# Patient Record
Sex: FEMALE | Race: WHITE | HISPANIC OR LATINO | Employment: FULL TIME | URBAN - METROPOLITAN AREA
[De-identification: names, ages, dates, MRNs, and addresses within clinical notes are randomized per-mention and may not be internally consistent; named-entity substitution may affect disease eponyms.]

---

## 2018-05-02 DIAGNOSIS — Z01.812 PRE-OPERATIVE LABORATORY EXAMINATION: ICD-10-CM

## 2018-05-02 LAB
BASOPHILS # BLD AUTO: 0.7 % (ref 0–1.8)
BASOPHILS # BLD: 0.05 K/UL (ref 0–0.12)
EOSINOPHIL # BLD AUTO: 0.1 K/UL (ref 0–0.51)
EOSINOPHIL NFR BLD: 1.5 % (ref 0–6.9)
ERYTHROCYTE [DISTWIDTH] IN BLOOD BY AUTOMATED COUNT: 37.8 FL (ref 35.9–50)
HCG SERPL QL: NEGATIVE
HCT VFR BLD AUTO: 41.9 % (ref 37–47)
HGB BLD-MCNC: 14.1 G/DL (ref 12–16)
IMM GRANULOCYTES # BLD AUTO: 0.03 K/UL (ref 0–0.11)
IMM GRANULOCYTES NFR BLD AUTO: 0.4 % (ref 0–0.9)
LYMPHOCYTES # BLD AUTO: 1.68 K/UL (ref 1–4.8)
LYMPHOCYTES NFR BLD: 24.9 % (ref 22–41)
MCH RBC QN AUTO: 29.3 PG (ref 27–33)
MCHC RBC AUTO-ENTMCNC: 33.7 G/DL (ref 33.6–35)
MCV RBC AUTO: 86.9 FL (ref 81.4–97.8)
MONOCYTES # BLD AUTO: 0.41 K/UL (ref 0–0.85)
MONOCYTES NFR BLD AUTO: 6.1 % (ref 0–13.4)
NEUTROPHILS # BLD AUTO: 4.48 K/UL (ref 2–7.15)
NEUTROPHILS NFR BLD: 66.4 % (ref 44–72)
NRBC # BLD AUTO: 0 K/UL
NRBC BLD-RTO: 0 /100 WBC
PLATELET # BLD AUTO: 374 K/UL (ref 164–446)
PMV BLD AUTO: 8.6 FL (ref 9–12.9)
RBC # BLD AUTO: 4.82 M/UL (ref 4.2–5.4)
WBC # BLD AUTO: 6.8 K/UL (ref 4.8–10.8)

## 2018-05-02 PROCEDURE — 36415 COLL VENOUS BLD VENIPUNCTURE: CPT

## 2018-05-02 PROCEDURE — 84703 CHORIONIC GONADOTROPIN ASSAY: CPT

## 2018-05-02 PROCEDURE — 85025 COMPLETE CBC W/AUTO DIFF WBC: CPT

## 2018-05-03 ENCOUNTER — HOSPITAL ENCOUNTER (OUTPATIENT)
Facility: MEDICAL CENTER | Age: 38
End: 2018-05-03
Attending: PLASTIC SURGERY | Admitting: PLASTIC SURGERY
Payer: COMMERCIAL

## 2018-05-03 VITALS
SYSTOLIC BLOOD PRESSURE: 136 MMHG | WEIGHT: 184.3 LBS | HEART RATE: 59 BPM | DIASTOLIC BLOOD PRESSURE: 83 MMHG | TEMPERATURE: 98.4 F | RESPIRATION RATE: 16 BRPM | HEIGHT: 64 IN | BODY MASS INDEX: 31.47 KG/M2 | OXYGEN SATURATION: 95 %

## 2018-05-03 DIAGNOSIS — C50.819 OVERLAPPING MALIGNANT NEOPLASM OF FEMALE BREAST, UNSPECIFIED ESTROGEN RECEPTOR STATUS, UNSPECIFIED LATERALITY (HCC): ICD-10-CM

## 2018-05-03 PROCEDURE — 160035 HCHG PACU - 1ST 60 MINS PHASE I: Performed by: PLASTIC SURGERY

## 2018-05-03 PROCEDURE — 500438 HCHG DRESSING, SPANDAGE #10 12: Performed by: PLASTIC SURGERY

## 2018-05-03 PROCEDURE — A6402 STERILE GAUZE <= 16 SQ IN: HCPCS | Performed by: PLASTIC SURGERY

## 2018-05-03 PROCEDURE — 160028 HCHG SURGERY MINUTES - 1ST 30 MINS LEVEL 3: Performed by: PLASTIC SURGERY

## 2018-05-03 PROCEDURE — A9270 NON-COVERED ITEM OR SERVICE: HCPCS

## 2018-05-03 PROCEDURE — 501445 HCHG STAPLER, SKIN DISP: Performed by: PLASTIC SURGERY

## 2018-05-03 PROCEDURE — 160036 HCHG PACU - EA ADDL 30 MINS PHASE I: Performed by: PLASTIC SURGERY

## 2018-05-03 PROCEDURE — 700101 HCHG RX REV CODE 250

## 2018-05-03 PROCEDURE — 700111 HCHG RX REV CODE 636 W/ 250 OVERRIDE (IP)

## 2018-05-03 PROCEDURE — 160002 HCHG RECOVERY MINUTES (STAT): Performed by: PLASTIC SURGERY

## 2018-05-03 PROCEDURE — 160009 HCHG ANES TIME/MIN: Performed by: PLASTIC SURGERY

## 2018-05-03 PROCEDURE — 501838 HCHG SUTURE GENERAL: Performed by: PLASTIC SURGERY

## 2018-05-03 PROCEDURE — 700102 HCHG RX REV CODE 250 W/ 637 OVERRIDE(OP)

## 2018-05-03 PROCEDURE — 160048 HCHG OR STATISTICAL LEVEL 1-5: Performed by: PLASTIC SURGERY

## 2018-05-03 PROCEDURE — 500054 HCHG BANDAGE, ELASTIC 6: Performed by: PLASTIC SURGERY

## 2018-05-03 PROCEDURE — 500423 HCHG DRESSING, ABD COMBINE: Performed by: PLASTIC SURGERY

## 2018-05-03 RX ORDER — SODIUM CHLORIDE, SODIUM LACTATE, POTASSIUM CHLORIDE, CALCIUM CHLORIDE 600; 310; 30; 20 MG/100ML; MG/100ML; MG/100ML; MG/100ML
INJECTION, SOLUTION INTRAVENOUS
Status: DISCONTINUED | OUTPATIENT
Start: 2018-05-03 | End: 2018-05-03 | Stop reason: HOSPADM

## 2018-05-03 RX ORDER — BUPIVACAINE HYDROCHLORIDE AND EPINEPHRINE 5; 5 MG/ML; UG/ML
INJECTION, SOLUTION EPIDURAL; INTRACAUDAL; PERINEURAL
Status: DISCONTINUED | OUTPATIENT
Start: 2018-05-03 | End: 2018-05-03 | Stop reason: HOSPADM

## 2018-05-03 RX ORDER — ONDANSETRON 2 MG/ML
4 INJECTION INTRAMUSCULAR; INTRAVENOUS EVERY 4 HOURS PRN
Status: DISCONTINUED | OUTPATIENT
Start: 2018-05-03 | End: 2018-05-03 | Stop reason: HOSPADM

## 2018-05-03 RX ORDER — ACETAMINOPHEN 325 MG/1
325 TABLET ORAL EVERY 4 HOURS PRN
Status: DISCONTINUED | OUTPATIENT
Start: 2018-05-03 | End: 2018-05-03 | Stop reason: HOSPADM

## 2018-05-03 RX ORDER — OXYCODONE HYDROCHLORIDE 5 MG/1
10 TABLET ORAL
Status: DISCONTINUED | OUTPATIENT
Start: 2018-05-03 | End: 2018-05-03 | Stop reason: HOSPADM

## 2018-05-03 RX ORDER — ACETAMINOPHEN 650 MG/1
650 SUPPOSITORY RECTAL EVERY 4 HOURS PRN
Status: DISCONTINUED | OUTPATIENT
Start: 2018-05-03 | End: 2018-05-03 | Stop reason: HOSPADM

## 2018-05-03 RX ORDER — MORPHINE SULFATE 4 MG/ML
5 INJECTION, SOLUTION INTRAMUSCULAR; INTRAVENOUS
Status: DISCONTINUED | OUTPATIENT
Start: 2018-05-03 | End: 2018-05-03 | Stop reason: HOSPADM

## 2018-05-03 RX ORDER — OXYCODONE HYDROCHLORIDE AND ACETAMINOPHEN 5; 325 MG/1; MG/1
1-2 TABLET ORAL EVERY 6 HOURS PRN
Qty: 10 TAB | Refills: 0 | Status: SHIPPED | OUTPATIENT
Start: 2018-05-03 | End: 2018-05-05

## 2018-05-03 RX ORDER — OXYCODONE HCL 5 MG/5 ML
SOLUTION, ORAL ORAL
Status: COMPLETED
Start: 2018-05-03 | End: 2018-05-03

## 2018-05-03 RX ORDER — SODIUM CHLORIDE, SODIUM LACTATE, POTASSIUM CHLORIDE, CALCIUM CHLORIDE 600; 310; 30; 20 MG/100ML; MG/100ML; MG/100ML; MG/100ML
INJECTION, SOLUTION INTRAVENOUS CONTINUOUS
Status: DISCONTINUED | OUTPATIENT
Start: 2018-05-03 | End: 2018-05-03 | Stop reason: HOSPADM

## 2018-05-03 RX ADMIN — OXYCODONE HYDROCHLORIDE 5 MG: 5 SOLUTION ORAL at 10:27

## 2018-05-03 ASSESSMENT — PAIN SCALES - GENERAL
PAINLEVEL_OUTOF10: 0
PAINLEVEL_OUTOF10: 2
PAINLEVEL_OUTOF10: 0
PAINLEVEL_OUTOF10: 5
PAINLEVEL_OUTOF10: 0
PAINLEVEL_OUTOF10: 5

## 2018-05-03 NOTE — OR SURGEON
Immediate Post OP Note    PreOp Diagnosis: history of right breast cancer and right chest wall radiation    PostOp Diagnosis: same    Procedure(s):  TISSUE EXPANDER PLACE/REMOVE - REMOVAL - Wound Class: Clean    Surgeon(s):  Jose L Huff M.D.    Anesthesiologist/Type of Anesthesia:  Anesthesiologist: Zhanna Hardy M.D./General    Surgical Staff:  Circulator: Mita Echavarria R.N.; Dian Carrizales R.N.  Scrub Person: Sil Alfaro    Specimens removed if any:  * No specimens in log *    Estimated Blood Loss: minimal    Findings: see operative note    Complications: no apparent    #[id number]    5/3/2018 9:34 AM Jose L Huff M.D.

## 2018-05-03 NOTE — OR NURSING
1110: assumed care of patient at this time.  Pt sleeping.  vss.    1135: pt up oob to bathroom to void.  Pt denies complaints.   1142: Pt discharged at this time.  Pt and family verbalize understanding of d/c instructions

## 2018-05-03 NOTE — DISCHARGE INSTRUCTIONS
ACTIVITY: Rest and take it easy for the first 24 hours.  A responsible adult is recommended to remain with you during that time.  It is normal to feel sleepy.  We encourage you to not do anything that requires balance, judgment or coordination.    MILD FLU-LIKE SYMPTOMS ARE NORMAL. YOU MAY EXPERIENCE GENERALIZED MUSCLE ACHES, THROAT IRRITATION, HEADACHE AND/OR SOME NAUSEA.    FOR 24 HOURS DO NOT:  Drive, operate machinery or run household appliances.  Drink beer or alcoholic beverages.   Make important decisions or sign legal documents.    SPECIAL INSTRUCTIONS: May use ice pack to chest as needed.    DIET: To avoid nausea, slowly advance diet as tolerated, avoiding spicy or greasy foods for the first day.  Add more substantial food to your diet according to your physician's instructions.  Babies can be fed formula or breast milk as soon as they are hungry.  INCREASE FLUIDS AND FIBER TO AVOID CONSTIPATION.    SURGICAL DRESSING/BATHING: Please keep dressings clean, dry and intact until follow-up. No showers while drain is in place. See instructions for drain care.     FOLLOW-UP APPOINTMENT:  A follow-up appointment should be arranged with your doctor in 05/09/2018; call to schedule.    You should CALL YOUR PHYSICIAN if you develop:  Fever greater than 101 degrees F.  Pain not relieved by medication, or persistent nausea or vomiting.  Excessive bleeding (blood soaking through dressing) or unexpected drainage from the wound.  Extreme redness or swelling around the incision site, drainage of pus or foul smelling drainage.  Inability to urinate or empty your bladder within 8 hours.  Problems with breathing or chest pain.    You should call 911 if you develop problems with breathing or chest pain.  If you are unable to contact your doctor or surgical center, you should go to the nearest emergency room or urgent care center.  Physician's telephone #: [phone]    If any questions arise, call your doctor.  If your doctor is  not available, please feel free to call the Surgical Center at [phone].  The Center is open Monday through Friday from 7AM to 7PM.  You can also call the HEALTH HOTLINE open 24 hours/day, 7 days/week and speak to a nurse at [phone], or toll free at [phone].    A registered nurse may call you a few days after your surgery to see how you are doing after your procedure.    MEDICATIONS: Resume taking daily medication.  Take prescribed pain medication with food.  If no medication is prescribed, you may take non-aspirin pain medication if needed.  PAIN MEDICATION CAN BE VERY CONSTIPATING.  Take a stool softener or laxative such as senokot, pericolace, or milk of magnesia if needed.    Prescription given for percocet.  Last pain medication given at 10:27am.    If your physician has prescribed pain medication that includes Acetaminophen (Tylenol), do not take additional Acetaminophen (Tylenol) while taking the prescribed medication.    Depression / Suicide Risk    As you are discharged from this [facility] facility, it is important to learn how to keep safe from harming yourself.    Recognize the warning signs:  · Abrupt changes in personality, positive or negative- including increase in energy   · Giving away possessions  · Change in eating patterns- significant weight changes-  positive or negative  · Change in sleeping patterns- unable to sleep or sleeping all the time   · Unwillingness or inability to communicate  · Depression  · Unusual sadness, discouragement and loneliness  · Talk of wanting to die  · Neglect of personal appearance   · Rebelliousness- reckless behavior  · Withdrawal from people/activities they love  · Confusion- inability to concentrate     If you or a loved one observes any of these behaviors or has concerns about self-harm, here's what you can do:  · Talk about it- your feelings and reasons for harming yourself  · Remove any means that you might use to hurt yourself  (examples: pills, rope, extension cords, firearm)  · Get professional help from the community (Mental Health, Substance Abuse, psychological counseling)  · Do not be alone:Call your Safe Contact- someone whom you trust who will be there for you.  · Call your local CRISIS HOTLINE 424-7099 or 370-378-4284  · Call your local Children's Mobile Crisis Response Team [place] (300) 646-0418 or www.[employer]  · Call the toll free National Suicide Prevention Hotlines   · National Suicide Prevention Lifeline 361-412-UBID (4198)  · National Hope Line Network 800-SUICIDE (395-4725)

## 2018-05-03 NOTE — OP REPORT
DATE OF SERVICE:  05/03/2018    PREOPERATIVE DIAGNOSES:  1.  History of right breast cancer.  2.  History of right chest wall radiation.  3.  A deflation of right tissue expander.    POSTOPERATIVE DIAGNOSES:  1.  History of right breast cancer.  2.  History of right chest wall radiation.  3.  A deflation of right tissue expander.    PROCEDURE: right tissue expander removal without implant placement.    ATTENDING SURGEON:  Jose L Huff MD    ANESTHESIOLOGIST:  Zhanna Hardy MD    ANESTHESIA TYPE:  General.    SPECIMENS:  None.    ESTIMATED BLOOD LOSS:  Minimal.    COMPLICATIONS:  No apparent.    ASSISTANT:  None.    INDICATIONS FOR PROCEDURE:  The patient is a 37-year-old woman who is well   known to me who previously underwent bilateral mastectomies and reconstruction   with placement of tissue expanders.  The patient did have problems on the   left side due to an infection and ultimately underwent tissue expander   removal.  The patient had required radiation therapy on the right side and had   deflation of her expander.  The patient has chosen not to pursue further   breast reconstruction.  We did discuss that if she were to lose weight in the   future and that she did want to undergo reconstruction we would need to do a   latissimus muscle flap on the radiated side.  At this point, the patient does   not want to do this.  She now presents for removal of the right tissue   expander.    DESCRIPTION OF PROCEDURE:  After the operative and nonoperative options were   discussed and include was not limited to bleeding, infection, damage to   surrounding structures, need for further surgery, reaction to anesthetic   agent, scarring, breast asymmetry, contour irregularities, wound healing   difficulties, need for revisional surgery, deep venous thrombosis, pulmonary   embolus, myocardial infarction, stroke, unsatisfactory result and/or death,   informed consent was obtained.  Patient brought to the operating room  where   general anesthesia was induced.  Her chest was then prepped and draped in   usual sterile fashion.  An incision was made down to the old mastectomy scar.    Cautery was then used to dissect down through the underlying tissue down to   the underlying capsule, which was opened up.  The tissue expander was already   deflated.  The tissue expander was then grasped and removed.  The pocket was   then irrigated with irrigation.  A #7 flat drain was placed and secured with   3-0 nylon suture.  The overlying tissue was then closed with 2-0 Vicryl   sutures in the capsule, 3-0 deep dermal Monocryl sutures and running 4-0   Monocryl subcuticular stitch.  Local anesthetic was placed for postoperative   pain control.  The Steri-Strips and compressive dressing was then placed.  The   patient was then awakened, extubated and transferred to the PACU in stable   condition.  At the end of procedure, all sponge, instrument and needle counts   were correct.       ____________________________________     MD PRAKASH RODRIGUEZ / JENSEN    DD:  05/03/2018 10:07:22  DT:  05/03/2018 10:26:35    D#:  [id number]  Job#:  [id number]

## 2018-05-03 NOTE — OR NURSING
1010 Patient arrived from OR on [facility]. Report received from RN and Anesthesia. Patient's VS WNL, patient arousable, stable, breathing even/non labored.       1027 Patient complained of 5/10 pain, oxycodone given as ordered.     1040  at bedside. Prescription for percocet given to .     1115 Report given to MARIAM Ruiz. Patient sleeping, VSS.

## 2018-08-29 ENCOUNTER — OCCUPATIONAL MEDICINE (OUTPATIENT)
Dept: URGENT CARE | Facility: PHYSICIAN GROUP | Age: 38
End: 2018-08-29
Payer: COMMERCIAL

## 2018-08-29 VITALS
SYSTOLIC BLOOD PRESSURE: 120 MMHG | OXYGEN SATURATION: 97 % | DIASTOLIC BLOOD PRESSURE: 76 MMHG | HEIGHT: 64 IN | HEART RATE: 77 BPM | TEMPERATURE: 96.9 F | WEIGHT: 181 LBS | RESPIRATION RATE: 18 BRPM | BODY MASS INDEX: 30.9 KG/M2

## 2018-08-29 DIAGNOSIS — S06.0X0A CONCUSSION WITHOUT LOSS OF CONSCIOUSNESS, INITIAL ENCOUNTER: ICD-10-CM

## 2018-08-29 DIAGNOSIS — M62.838 MUSCLE SPASM: ICD-10-CM

## 2018-08-29 PROCEDURE — 99214 OFFICE O/P EST MOD 30 MIN: CPT | Mod: 29 | Performed by: FAMILY MEDICINE

## 2018-08-29 RX ORDER — CYCLOBENZAPRINE HCL 10 MG
10 TABLET ORAL
Qty: 15 TAB | Refills: 0 | Status: SHIPPED | OUTPATIENT
Start: 2018-08-29 | End: 2020-11-12
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
## 2018-09-01 ENCOUNTER — OCCUPATIONAL MEDICINE (OUTPATIENT)
Dept: URGENT CARE | Facility: PHYSICIAN GROUP | Age: 38
End: 2018-09-01
Payer: COMMERCIAL

## 2018-09-01 VITALS
WEIGHT: 182.2 LBS | BODY MASS INDEX: 31.1 KG/M2 | HEIGHT: 64 IN | OXYGEN SATURATION: 97 % | DIASTOLIC BLOOD PRESSURE: 80 MMHG | RESPIRATION RATE: 12 BRPM | HEART RATE: 97 BPM | SYSTOLIC BLOOD PRESSURE: 122 MMHG | TEMPERATURE: 98.4 F

## 2018-09-01 DIAGNOSIS — S06.0X0D CONCUSSION WITHOUT LOSS OF CONSCIOUSNESS, SUBSEQUENT ENCOUNTER: ICD-10-CM

## 2018-09-01 PROCEDURE — 99213 OFFICE O/P EST LOW 20 MIN: CPT | Mod: 29 | Performed by: PHYSICIAN ASSISTANT
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
## 2019-06-21 ENCOUNTER — HOSPITAL ENCOUNTER (OUTPATIENT)
Dept: LAB | Facility: MEDICAL CENTER | Age: 39
End: 2019-06-21
Attending: OBSTETRICS & GYNECOLOGY
Payer: COMMERCIAL

## 2019-06-21 PROCEDURE — 88175 CYTOPATH C/V AUTO FLUID REDO: CPT

## 2019-06-22 LAB — CYTOLOGY REG CYTOL: NORMAL

## 2020-03-19 ENCOUNTER — HOSPITAL ENCOUNTER (OUTPATIENT)
Dept: RADIOLOGY | Facility: MEDICAL CENTER | Age: 40
End: 2020-03-19
Attending: INTERNAL MEDICINE
Payer: COMMERCIAL

## 2020-03-19 DIAGNOSIS — C50.411 MALIGNANT NEOPLASM OF UPPER-OUTER QUADRANT OF RIGHT FEMALE BREAST, UNSPECIFIED ESTROGEN RECEPTOR STATUS (HCC): ICD-10-CM

## 2020-03-19 PROCEDURE — 76705 ECHO EXAM OF ABDOMEN: CPT

## 2020-10-27 ENCOUNTER — HOSPITAL ENCOUNTER (OUTPATIENT)
Dept: LAB | Facility: MEDICAL CENTER | Age: 40
End: 2020-10-27
Attending: OBSTETRICS & GYNECOLOGY
Payer: COMMERCIAL

## 2020-10-27 PROCEDURE — 88175 CYTOPATH C/V AUTO FLUID REDO: CPT

## 2020-10-27 PROCEDURE — 87624 HPV HI-RISK TYP POOLED RSLT: CPT

## 2020-10-29 LAB
CYTOLOGY REG CYTOL: NORMAL
HPV HR 12 DNA CVX QL NAA+PROBE: NEGATIVE
HPV16 DNA SPEC QL NAA+PROBE: NEGATIVE
HPV18 DNA SPEC QL NAA+PROBE: NEGATIVE
SPECIMEN SOURCE: NORMAL

## 2020-11-12 ENCOUNTER — APPOINTMENT (OUTPATIENT)
Dept: RADIOLOGY | Facility: IMAGING CENTER | Age: 40
End: 2020-11-12
Attending: NURSE PRACTITIONER
Payer: COMMERCIAL

## 2020-11-12 ENCOUNTER — OCCUPATIONAL MEDICINE (OUTPATIENT)
Dept: URGENT CARE | Facility: PHYSICIAN GROUP | Age: 40
End: 2020-11-12
Payer: COMMERCIAL

## 2020-11-12 VITALS
BODY MASS INDEX: 31.76 KG/M2 | WEIGHT: 186 LBS | TEMPERATURE: 97.2 F | RESPIRATION RATE: 16 BRPM | OXYGEN SATURATION: 97 % | DIASTOLIC BLOOD PRESSURE: 70 MMHG | SYSTOLIC BLOOD PRESSURE: 110 MMHG | HEIGHT: 64 IN | HEART RATE: 73 BPM

## 2020-11-12 DIAGNOSIS — S69.91XA HAND INJURY, RIGHT, INITIAL ENCOUNTER: ICD-10-CM

## 2020-11-12 DIAGNOSIS — S60.221A CONTUSION OF RIGHT HAND, INITIAL ENCOUNTER: ICD-10-CM

## 2020-11-12 PROCEDURE — 99214 OFFICE O/P EST MOD 30 MIN: CPT | Performed by: NURSE PRACTITIONER

## 2020-11-12 PROCEDURE — 73130 X-RAY EXAM OF HAND: CPT | Mod: TC,RT | Performed by: NURSE PRACTITIONER

## 2020-11-12 ASSESSMENT — ENCOUNTER SYMPTOMS
FEVER: 0
MYALGIAS: 0
CHILLS: 0
VOMITING: 0
TINGLING: 0
SHORTNESS OF BREATH: 0
NAUSEA: 0

## 2020-11-12 NOTE — PROGRESS NOTES
"Subjective:      Brenna Abraham is a 40 y.o. 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  She denies any numbness or tingling in the extremity.  She reports pain with palpation.  She has not taken anything for this.  Injured at work: Yes.  Previous injury: None.  Contributing medical illness: None.  Second job: None.  Outside activity: None.  Location: Right hand          Review of Systems   Constitutional: Negative for chills and fever.   Respiratory: Negative for shortness of breath.    Cardiovascular: Negative for chest pain.   Gastrointestinal: Negative for nausea and vomiting.   Musculoskeletal: Positive for joint pain. Negative for myalgias.   Skin: Negative for rash.   Neurological: Negative for tingling.   All other systems reviewed and are negative.    PMH: No pertinent past medical history to this problem  MEDS: Medications were reviewed in EMR  ALLERGIES: Allergies were reviewed in EMR  SOCHX: Works in a warehouse  FH: No pertinent family history to this problem         Objective:     /70 (BP Location: Left arm, Patient Position: Sitting, BP Cuff Size: Adult)   Pulse 73   Temp 36.2 °C (97.2 °F) (Temporal)   Resp 16   Ht 1.626 m (5' 4\")   Wt 84.4 kg (186 lb)   SpO2 97%   BMI 31.93 kg/m²      Physical Exam  Vitals signs reviewed.   Constitutional:       General: She is not in acute distress.     Appearance: Normal appearance.   HENT:      Head: Normocephalic.      Right Ear: External ear normal.      Left Ear: External ear normal.   Neck:      Musculoskeletal: Normal range of motion. No neck rigidity.   Cardiovascular:      Rate and Rhythm: Normal rate.   " Pulses: Normal pulses.   Pulmonary:      Effort: Pulmonary effort is normal.   Abdominal:      Palpations: Abdomen is soft.   Musculoskeletal: Normal range of motion.        Hands:    Skin:     General: Skin is warm.   Neurological:      Mental Status: She is alert and oriented to person, place, and time.   Psychiatric:         Mood and Affect: Mood normal.         Behavior: Behavior normal.         Right hand/wrist: Mild soft tissue swelling over dorsum of hand.  No deformity or bruising noted. Moderate tenderness to palpation. No snuff box tenderness.  ROM limited secondary to pain. Strength and sensation intact.  Distal N/V intact           Assessment/Plan:        1. Contusion of right hand, initial encounter  DX-HAND 3+ RIGHT     Xray: no fracture or dislocation per radiology  RICE therapy discussed  Gentle ROM exercises discussed  Ice/heat therapy discussed  May take over-the-counter Ibuprofen 600-800 mg every 8 hours as needed for pain  Rest, fluids encouraged.  Work restrictions per D-39  F/U in 4 days.   Pt was in full understanding and agreement with the plan.  Follow-up as needed if symptoms worsen or fail to improve.

## 2020-11-12 NOTE — LETTER
[address] - [place] [address]  Phone:  [phone] - Fax:  [phone]   [facility] Progress Report and Disability Certification  Date of Service: 2020   No Show:  No  Date / Time of Next Visit: 2020 @8:00AM   Claim Information   Patient Name: Brenna Abraham  Claim Number:     Employer:   [employer] Date of Injury: 2020     Insurer / TPA:    ID / SSN:     Occupation: Receiving  Diagnosis: The encounter diagnosis was Contusion of right hand, initial encounter.    Medical Information   Related to Industrial Injury? 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  She denies any numbness or tingling in the extremity.  She reports pain with palpation.  She has not taken anything for this.  Injured at work: Yes.  Previous injury: None.  Contributing medical illness: None.  Second job: None.  Outside activity: None.  Location: Right hand        Objective Findings: Right hand/wrist: Mild soft tissue swelling over dorsum of hand.  No deformity or bruising noted. Moderate tenderness to palpation. No snuff box tenderness.  ROM limited secondary to pain. Strength and sensation intact.  Distal N/V intact       Pre-Existing Condition(s): None   Assessment:   Initial Visit    Status: Additional Care Required  Permanent Disability:No    Plan:   Comments:OTC nsaids    Diagnostics: X-ray  Comments:Negative for fracture     Comments:       Disability Information   Status: Released to Restricted Duty    From:  2020  Through: 2020 Restrictions are: Temporary   Physical Restrictions   Sitting:    Standing:    Stooping:    Bending:      Squatting:    Walking:    Climbing:    Pushin hrs/day  Comments:Right hand only    Pullin hrs/day  Comments:Right hand only Other:    Reaching Above Shoulder (L):   Reaching Above Shoulder (R):       Reaching Below Shoulder (L):    Reaching Below Shoulder (R):      Not to exceed Weight Limits   Carrying(hrs):   Weight Limit(lb):   Comments:No lifting right hand only Lifting(hrs):   Weight  Limit(lb):   Comments:No lifting right hand only   Comments: Rest, ice/heat therapy discussed, gentle ROM exercises encouraged, OTC ibuprofen, work restrictions Thumb spica  F/U in 4 days.      Repetitive Actions   Hands: i.e. Fine Manipulations from Grasping:     Feet: i.e. Operating Foot Controls:     Driving / Operate Machinery:     Provider Name:   JOANA Vásquez Physician Signature:  Physician Name:     Clinic Name / Location: [address]  [place] [address] Clinic Phone Number: Dept: [phone]   Appointment Time: 12:55 Pm Visit Start Time: 2:09 PM   Check-In Time:  1:25 Pm Visit Discharge Time:  3:37 PM   Original-Treating Physician or Chiropractor    Page 2-Insurer/TPA    Page 3-Employer    Page 4-Employee

## 2020-11-12 NOTE — LETTER
"EMPLOYEE’S CLAIM FOR COMPENSATION/ REPORT OF INITIAL TREATMENT  FORM C-4    EMPLOYEE’S CLAIM - PROVIDE ALL INFORMATION REQUESTED   First Name  Brenna Last Name  Leigh Birthdate                    1980                Sex  female Claim Number   Home Address  [address] Age  40 y.o. 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 (if applicable)  Receiving    How did this injury or occupational disease occur? (Be specific an answer in detail. Use additional sheet if necessary)  I was receiving items and i didn't notice when i pull a box was another on the top so i pull it and fall in my hand   If you believe that you have an occupational disease, when did you first have knowledge of the disability and it relationship to your employment?  N/A Witnesses to the Accident  None      Nature of Injury or Occupational Disease  Sprain  Part(s) of Body Injured or Affected  Hand (R), N/A, N/A    I certify that the above is true and correct to the best of my knowledge and that I have provided this information in order to " obtain the benefits of [place]  I hereby authorize any physician, chiropractor, surgeon, practitioner, or other person, any hospital, including [facility] or [facility] hospital, any medical service organization, any insurance company, or other institution or organization to release to each other, any medical or other information, including benefits paid or payable, pertinent to this injury or disease, except information relative to diagnosis, treatment and/or counseling for AIDS, psychological conditions, alcohol or controlled substances, for which I must give specific authorization.  A Photostat of this authorization shall be as valid as the original.     Date 11/12/2020   Place [place]   Employee’s Signature   THIS REPORT MUST BE COMPLETED AND MAILED WITHIN 3 WORKING DAYS OF TREATMENT   Place  [facility]  Name of Facility  [place]   Date  11/12/2020 Diagnosis  (S60.221A) Contusion of right hand, initial encounter Is there evidence the injured employee was under the              influence of alcohol and/or another controlled substance at the time of accident?   Hour  2:09 PM Description of Injury or Disease  The encounter diagnosis was Contusion of right hand, initial encounter. No   Treatment  RICE therapy  OTC ibuprofen    Have you advised the patient to remain off work five days or     more? No   X-Ray Findings  Negative   If Yes   From Date  To Date      From information given by the employee, together with medical evidence, can you directly connect this injury or occupational disease as job incurred?  Yes If No Full Duty    Yes Modified Duty  No   Is additional medical care by a physician indicated?  Yes If Modified Duty, Specify any Limitations / Restrictions  No lifting, pushing or pulling right hand    Do you know of any previous injury or disease contributing  "to this condition or occupational disease?                            No   Date  11/12/2020 Print Doctor’s Name   JOANA Vásquez I certify the employer’s copy of  this form was mailed on:   Address  [address] Insurer’s Use Only     [facility] Zip  [address]    Provider’s Tax ID Number  [id number] Telephone  Dept: [phone]      e-DEVANTE Graham  Signature:     Degree          ORIGINAL-TREATING PHYSICIAN OR CHIROPRACTOR    PAGE 2-INSURER/TPA    PAGE 3-EMPLOYER    PAGE 4-EMPLOYEE        Form C-4 (rev.10/07)          BRIEF DESCRIPTION OF RIGHTS AND BENEFITS  (Pursuant to [place] 616C.[place])    Notice of Injury or Occupational Disease (Incident Report Form C-1): If an injury or occupational disease (OD) arises out of and in the course of employment, you must provide written notice to your employer as soon as practicable, but no later than 7 days after the accident or OD. Your employer shall maintain a sufficient supply of the required forms.     Claim for Compensation (Form C-4): If medical treatment is sought, the form C-4 is available at the place of initial treatment. A completed \"Claim for Compensation\" (Form C-4) must be filed within 90 days after an accident or OD. The treating physician or chiropractor must, within 3 working days after treatment, complete and mail to the employer, the employer's insurer and third-party , the Claim for Compensation.     Medical Treatment: If you require medical treatment for your on-the-job injury or OD, you may be required to select a physician or chiropractor from a list provided by your workers’ compensation insurer, if it has contracted with an Organization for Managed Care (MCO) or Preferred Provider Organization (PPO) or providers of health care. If your employer has not entered into a contract with an MCO or PPO, you may select a physician or chiropractor from the Panel of Physicians and Chiropractors. " Any medical costs related to your industrial injury or OD will be paid by your insurer.     Temporary Total Disability (TTD): If your doctor has certified that you are unable to work for a period of at least 5 consecutive days, or 5 cumulative days in a 20-day period, or places restrictions on you that your employer does not accommodate, you may be entitled to TTD compensation.     Temporary Partial Disability (TPD): If the wage you receive upon reemployment is less than the compensation for TTD to which you are entitled, the insurer may be required to pay you TPD compensation to make up the difference. TPD can only be paid for a maximum of 24 months.     Permanent Partial Disability (PPD): When your medical condition is stable and there is an indication of a PPD as a result of your injury or OD, within 30 days, your insurer must arrange for an evaluation by a rating physician or chiropractor to determine the degree of your PPD. The amount of your PPD award depends on the date of injury, the results of the PPD evaluation and your age and wage.     Permanent Total Disability (PTD): If you are medically certified by a treating physician or chiropractor as permanently and totally disabled and have been granted a PTD status by your insurer, you are entitled to receive monthly benefits not to exceed 66 2/3% of your average monthly wage. The amount of your PTD payments is subject to reduction if you previously received a PPD award.     Vocational Rehabilitation Services: You may be eligible for vocational rehabilitation services if you are unable to return to the job due to a permanent physical impairment or permanent restrictions as a result of your injury or occupational disease.     Transportation and Per Mateo Reimbursement: You may be eligible for travel expenses and per mateo associated with medical treatment.     Reopening: You may be able to reopen your claim if your condition worsens after claim closure.     Appeal  Process: If you disagree with a written determination issued by the insurer or the insurer does not respond to your request, you may appeal to the Department of Administration, , by following the instructions contained in your determination letter. You must appeal the determination within 70 days from the date of the determination letter at [address], [address], [place], [place] [address], or [address], [address], [place], [place] [address]. If you disagree with the  decision, you may appeal to the Department of Administration, . You must file your appeal within 30 days from the date of the  decision letter at [address], [address], [place], [place] [address], or [address], [address], [place], [place] [address]. If you disagree with a decision of an , you may file a petition for judicial review with the District Court. You must do so within 30 days of the Appeal Officer’s decision. You may be represented by an  at your own expense or you may contact the [facility] for possible representation.     [place]  for Injured Workers (NAIW): If you disagree with a  decision, you may request that NAIW represent you without charge at an  Hearing. For information regarding denial of benefits, you may contact the [facility] at: [address], [address], [place] [address], [phone], or [address], [address], [place] [address], [phone]     To File a Complaint with the Division: If you wish to file a complaint with the  of the Division of Industrial Relations (DIR), please contact the Workers’ Compensation Section, [address], [address], [place], [place] [address], telephone [phone], or [address], [address], [place], [place] [address], telephone [phone].     For assistance with Workers’ Compensation  Issues: You may contact the Office of the Governor Consumer Health Assistance, [address] [place] [address], [place] [address], [place] [address], Toll Free 1-374.325.3062, Web site: http://govcha.[facility].nv., E-mail [email or web address].              __________________________________________________________________                              ___________________         Employee Name / Signature                                                                                                                     Date                                                                                                                                                                                       D-2 (rev. 01/20)

## 2020-11-16 ENCOUNTER — OCCUPATIONAL MEDICINE (OUTPATIENT)
Dept: URGENT CARE | Facility: PHYSICIAN GROUP | Age: 40
End: 2020-11-16
Payer: COMMERCIAL

## 2020-11-16 VITALS
WEIGHT: 184.6 LBS | RESPIRATION RATE: 16 BRPM | HEIGHT: 64 IN | SYSTOLIC BLOOD PRESSURE: 130 MMHG | BODY MASS INDEX: 31.51 KG/M2 | TEMPERATURE: 97.6 F | DIASTOLIC BLOOD PRESSURE: 88 MMHG | OXYGEN SATURATION: 97 % | HEART RATE: 71 BPM

## 2020-11-16 DIAGNOSIS — S60.221D CONTUSION OF RIGHT HAND, SUBSEQUENT ENCOUNTER: Primary | ICD-10-CM

## 2020-11-16 PROCEDURE — 99214 OFFICE O/P EST MOD 30 MIN: CPT | Performed by: PHYSICIAN ASSISTANT

## 2020-11-16 RX ORDER — IBUPROFEN 200 MG
200 TABLET ORAL EVERY 6 HOURS PRN
COMMUNITY
End: 2021-10-18

## 2020-11-16 ASSESSMENT — ENCOUNTER SYMPTOMS
COUGH: 0
FEVER: 0
NAUSEA: 0
CONSTIPATION: 0
CHILLS: 0
ABDOMINAL PAIN: 0
SHORTNESS OF BREATH: 0
DIARRHEA: 0
VOMITING: 0

## 2020-11-16 NOTE — LETTER
[facility]  [address] [place] [address]. [address] - [place] [address]  Phone:  [phone] - Fax:  [phone]   [facility] Progress Report and Disability Certification  Date of Service: 11/16/2020   No Show:  No  Date / Time of Next Visit:     Claim Information   Patient Name: Brenna Abraham  Claim Number:     Employer:   [employer]  Date of Injury: 11/12/2020     Insurer / TPA: [employer]  ID / SSN:     Occupation: Receiving  Diagnosis: The encounter diagnosis was Contusion of right hand, subsequent encounter.    Medical Information   Related to Industrial Injury? 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  She denies any numbness or tingling in the extremity.  She reports pain with palpation.  She has not taken anything for this.  Injured at work: Yes.  Previous injury: None.  Contributing medical illness: None.  Second job: None.  Outside activity: None.  Location: Right hand       F/u 11/16/20- patient's pain has resolved.  Pain is described as a 1/10.  No numbness or tingling.  Patient is ready to return to work full duty.   Objective Findings: Physical Exam  Vitals signs reviewed.   Constitutional:       General: She is not in acute distress.     Appearance: She is well-developed.   HENT:      Head: Normocephalic and atraumatic.      Right Ear: External ear normal.      Left Ear: External ear normal.      Nose: Nose normal.      Mouth/Throat:      Mouth: Mucous membranes are moist.   Eyes:      Conjunctiva/sclera: Conjunctivae normal.      Pupils: Pupils are equal, round, and reactive to light.   Neck:      Musculoskeletal: Normal range of motion and neck supple.      Trachea: No tracheal deviation.   Cardiovascular:       Rate and Rhythm: Normal rate and regular rhythm.   Pulmonary:      Effort: Pulmonary effort is normal.      Breath sounds: Normal breath sounds.   Musculoskeletal:      Comments: Right hand: No TTP.  Patient able to abduct/adduct, flex, extend, thumb opposition without pain.  Distal N/V intact.   Skin:     General: Skin is warm and dry.      Capillary Refill: Capillary refill takes less than 2 seconds.   Neurological:      General: No focal deficit present.      Mental Status: She is alert and oriented to person, place, and time.   Psychiatric:         Mood and Affect: Mood normal.         Behavior: Behavior normal.        Pre-Existing Condition(s):     Assessment:   Initial Visit    Status: Discharged /  MMI  Permanent Disability:No    Plan:      Diagnostics:      Comments:       Disability Information   Status: Released to Full Duty    From:  11/16/2020  Through:   Restrictions are:     Physical Restrictions   Sitting:    Standing:    Stooping:    Bending:      Squatting:    Walking:    Climbing:    Pushing:      Pulling:    Other:    Reaching Above Shoulder (L):   Reaching Above Shoulder (R):       Reaching Below Shoulder (L):    Reaching Below Shoulder (R):      Not to exceed Weight Limits   Carrying(hrs):   Weight Limit(lb):   Lifting(hrs):   Weight  Limit(lb):     Comments:      Repetitive Actions   Hands: i.e. Fine Manipulations from Grasping:     Feet: i.e. Operating Foot Controls:     Driving / Operate Machinery:     Provider Name:   Shawna Mazariegos P.A.-C. Physician Signature:  Physician Name:     Clinic Name / Location: [address]  [place] [address] Clinic Phone Number: Dept: [phone]   Appointment Time: 8:00 Am Visit Start Time: 7:59 AM   Check-In Time:  7:52 Am Visit Discharge Time:  8:40AM   Original-Treating Physician or Chiropractor    Page 2-Insurer/TPA    Page 3-Employer    Page 4-Employee

## 2020-11-16 NOTE — PROGRESS NOTES
"Subjective:   Brenna Abraham is a 40 y.o. female who presents for Work-Related Injury (WC FV R hand injury, pt states that they feel better. Swelling has reduced significatly. 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  She denies any numbness or tingling in the extremity.  She reports pain with palpation.  She has not taken anything for this.  Injured at work: Yes.  Previous injury: None.  Contributing medical illness: None.  Second job: None.  Outside activity: None.  Location: Right hand       F/u 11/16/20- patient's pain has resolved.  Pain is described as a 1/10.  No numbness or tingling.  Patient is ready to return to work full duty.     HPI  Review of Systems   Constitutional: Negative for chills, fever and malaise/fatigue.   Respiratory: Negative for cough and shortness of breath.    Gastrointestinal: Negative for abdominal pain, constipation, diarrhea, nausea and vomiting.   Musculoskeletal:        Right hand injury   All other systems reviewed and are negative.      PMH: No pertinent past medical history to this problem  MEDS: Medications were reviewed in Epic  ALLERGIES: Allergies were reviewed in Epic  SOCHX: Works as receiving   FH: No pertinent family history to this problem       Objective:   /88 (BP Location: Right arm, Patient Position: Sitting, BP Cuff Size: Adult)   Pulse 71   Temp 36.4 °C (97.6 °F) (Temporal)   Resp 16   Ht 1.626 m (5' 4\")   Wt 83.7 kg (184 lb 9.6 oz)   SpO2 97%   BMI 31.69 kg/m²     Physical Exam  Vitals signs reviewed.   Constitutional:       General: She is not in acute distress.     Appearance: She is well-developed.   HENT:      Head: Normocephalic and atraumatic.      Right Ear: External ear normal.      Left Ear: External ear normal.      Nose: Nose " normal.      Mouth/Throat:      Mouth: Mucous membranes are moist.   Eyes:      Conjunctiva/sclera: Conjunctivae normal.      Pupils: Pupils are equal, round, and reactive to light.   Neck:      Musculoskeletal: Normal range of motion and neck supple.      Trachea: No tracheal deviation.   Cardiovascular:      Rate and Rhythm: Normal rate and regular rhythm.   Pulmonary:      Effort: Pulmonary effort is normal.      Breath sounds: Normal breath sounds.   Musculoskeletal:      Comments: Right hand: No TTP.  Patient able to abduct/adduct, flex, extend, thumb opposition without pain.  Distal N/V intact.   Skin:     General: Skin is warm and dry.      Capillary Refill: Capillary refill takes less than 2 seconds.   Neurological:      General: No focal deficit present.      Mental Status: She is alert and oriented to person, place, and time.   Psychiatric:         Mood and Affect: Mood normal.         Behavior: Behavior normal.          Assessment/Plan:     1. Contusion of right hand, subsequent encounter       Continue to monitor area closely.  The patient will be released to MMI, D 39 provided.    If symptoms worsen or persist patient can return to clinic for reevaluation. Patient confirmed understanding of information.    Please note that this dictation was created using voice recognition software. I have made every reasonable attempt to correct obvious errors, but I expect that there are errors of grammar and possibly content that I did not discover before finalizing the note.

## 2020-12-17 ENCOUNTER — HOSPITAL ENCOUNTER (OUTPATIENT)
Dept: LAB | Facility: MEDICAL CENTER | Age: 40
End: 2020-12-17
Attending: FAMILY MEDICINE
Payer: COMMERCIAL

## 2020-12-17 LAB — COVID ORDER STATUS COVID19: NORMAL

## 2020-12-17 PROCEDURE — U0003 INFECTIOUS AGENT DETECTION BY NUCLEIC ACID (DNA OR RNA); SEVERE ACUTE RESPIRATORY SYNDROME CORONAVIRUS 2 (SARS-COV-2) (CORONAVIRUS DISEASE [COVID-19]), AMPLIFIED PROBE TECHNIQUE, MAKING USE OF HIGH THROUGHPUT TECHNOLOGIES AS DESCRIBED BY CMS-2020-01-R: HCPCS

## 2020-12-17 PROCEDURE — C9803 HOPD COVID-19 SPEC COLLECT: HCPCS

## 2020-12-18 LAB
SARS-COV-2 RNA RESP QL NAA+PROBE: NOTDETECTED
SPECIMEN SOURCE: NORMAL

## 2021-08-24 ENCOUNTER — HOSPITAL ENCOUNTER (OUTPATIENT)
Facility: MEDICAL CENTER | Age: 41
End: 2021-08-24
Attending: PHYSICIAN ASSISTANT
Payer: COMMERCIAL

## 2021-08-24 ENCOUNTER — OFFICE VISIT (OUTPATIENT)
Dept: URGENT CARE | Facility: PHYSICIAN GROUP | Age: 41
End: 2021-08-24
Payer: COMMERCIAL

## 2021-08-24 VITALS
HEIGHT: 64 IN | TEMPERATURE: 98.4 F | OXYGEN SATURATION: 96 % | WEIGHT: 181 LBS | BODY MASS INDEX: 30.9 KG/M2 | DIASTOLIC BLOOD PRESSURE: 70 MMHG | HEART RATE: 96 BPM | SYSTOLIC BLOOD PRESSURE: 108 MMHG | RESPIRATION RATE: 16 BRPM

## 2021-08-24 DIAGNOSIS — J02.9 PHARYNGITIS, UNSPECIFIED ETIOLOGY: ICD-10-CM

## 2021-08-24 DIAGNOSIS — R51.9 NONINTRACTABLE HEADACHE, UNSPECIFIED CHRONICITY PATTERN, UNSPECIFIED HEADACHE TYPE: ICD-10-CM

## 2021-08-24 LAB
COVID ORDER STATUS COVID19: NORMAL
INT CON NEG: NORMAL
INT CON POS: NORMAL
S PYO AG THROAT QL: NEGATIVE
SARS-COV-2 RNA RESP QL NAA+PROBE: NOTDETECTED
SPECIMEN SOURCE: NORMAL

## 2021-08-24 PROCEDURE — 87880 STREP A ASSAY W/OPTIC: CPT | Performed by: PHYSICIAN ASSISTANT

## 2021-08-24 PROCEDURE — 99214 OFFICE O/P EST MOD 30 MIN: CPT | Mod: CS | Performed by: PHYSICIAN ASSISTANT

## 2021-08-24 PROCEDURE — U0005 INFEC AGEN DETEC AMPLI PROBE: HCPCS

## 2021-08-24 PROCEDURE — U0003 INFECTIOUS AGENT DETECTION BY NUCLEIC ACID (DNA OR RNA); SEVERE ACUTE RESPIRATORY SYNDROME CORONAVIRUS 2 (SARS-COV-2) (CORONAVIRUS DISEASE [COVID-19]), AMPLIFIED PROBE TECHNIQUE, MAKING USE OF HIGH THROUGHPUT TECHNOLOGIES AS DESCRIBED BY CMS-2020-01-R: HCPCS

## 2021-08-24 PROCEDURE — 87070 CULTURE OTHR SPECIMN AEROBIC: CPT

## 2021-08-24 RX ORDER — LIDOCAINE HYDROCHLORIDE 20 MG/ML
15 SOLUTION OROPHARYNGEAL PRN
Qty: 600 ML | Refills: 0 | Status: SHIPPED | OUTPATIENT
Start: 2021-08-24 | End: 2021-10-18

## 2021-08-24 ASSESSMENT — ENCOUNTER SYMPTOMS
WHEEZING: 0
CHILLS: 1
MYALGIAS: 1
DIARRHEA: 0
EYE REDNESS: 0
VOMITING: 0
SORE THROAT: 1
EYE DISCHARGE: 0
HEADACHES: 1
COUGH: 0

## 2021-08-26 LAB
BACTERIA SPEC RESP CULT: NORMAL
SIGNIFICANT IND 70042: NORMAL
SITE SITE: NORMAL
SOURCE SOURCE: NORMAL

## 2021-10-18 ENCOUNTER — APPOINTMENT (OUTPATIENT)
Dept: RADIOLOGY | Facility: IMAGING CENTER | Age: 41
End: 2021-10-18
Attending: FAMILY MEDICINE
Payer: COMMERCIAL

## 2021-10-18 ENCOUNTER — OCCUPATIONAL MEDICINE (OUTPATIENT)
Dept: URGENT CARE | Facility: PHYSICIAN GROUP | Age: 41
End: 2021-10-18
Payer: COMMERCIAL

## 2021-10-18 VITALS
OXYGEN SATURATION: 98 % | DIASTOLIC BLOOD PRESSURE: 78 MMHG | RESPIRATION RATE: 16 BRPM | SYSTOLIC BLOOD PRESSURE: 112 MMHG | TEMPERATURE: 97.8 F | WEIGHT: 172 LBS | BODY MASS INDEX: 29.37 KG/M2 | HEIGHT: 64 IN | HEART RATE: 66 BPM

## 2021-10-18 DIAGNOSIS — S66.111A: ICD-10-CM

## 2021-10-18 DIAGNOSIS — S66.912A HAND STRAIN, LEFT, INITIAL ENCOUNTER: ICD-10-CM

## 2021-10-18 PROBLEM — G47.8 POOR SLEEP PATTERN: Status: ACTIVE | Noted: 2021-10-01

## 2021-10-18 PROBLEM — E66.9 OBESITY: Status: ACTIVE | Noted: 2021-10-01

## 2021-10-18 PROBLEM — Z86.39 HISTORY OF ENDOCRINE DISORDER: Status: ACTIVE | Noted: 2021-10-01

## 2021-10-18 PROBLEM — F41.9 ANXIETY: Status: ACTIVE | Noted: 2021-10-01

## 2021-10-18 PROCEDURE — 73130 X-RAY EXAM OF HAND: CPT | Mod: TC,LT | Performed by: FAMILY MEDICINE

## 2021-10-18 PROCEDURE — 99213 OFFICE O/P EST LOW 20 MIN: CPT | Performed by: FAMILY MEDICINE

## 2021-10-18 RX ORDER — ESCITALOPRAM OXALATE 10 MG/1
TABLET ORAL
COMMUNITY
Start: 2021-10-01 | End: 2021-10-18

## 2021-10-18 RX ORDER — ESCITALOPRAM OXALATE 10 MG/1
10 TABLET ORAL
COMMUNITY
Start: 2021-10-01 | End: 2021-10-18

## 2021-10-18 ASSESSMENT — ENCOUNTER SYMPTOMS
FOCAL WEAKNESS: 0
SENSORY CHANGE: 0

## 2021-10-18 NOTE — PATIENT INSTRUCTIONS
Finger Sprain, Adult  A finger sprain is a tear or stretch in a ligament in your finger. Ligaments are tissues that connect bones to each other.  Follow these instructions at home:  If you have a splint:    · Do not put pressure on any part of the splint until it is fully hardened. This may take many hours.  · Wear the splint as told by your doctor. Take it off only as told by your doctor.  · Loosen the splint if your fingers tingle, lose feeling (get numb), or turn cold and blue.  · Keep the splint clean.  · If the splint is not waterproof:  ? Do not let it get wet.  ? Cover it with a watertight covering when you take a bath or a shower.  If you have a cast:  · Do not put pressure on any part of the cast until it is fully hardened. This may take many hours.  · Do not stick anything inside the cast to scratch your skin.  · Check the skin around the cast every day. Tell your doctor about any concerns.  · You may put lotion on dry skin around the edges of the cast. Do not put lotion on the skin under the cast.  · Keep the cast clean.  · If the cast is not waterproof:  ? Do not let it get wet.  ? Cover it with a watertight covering when you take a bath or a shower.  Managing pain, stiffness, and swelling  · If directed, put ice on the injured area:  ? If you have a removable splint, take it off as told by your doctor.  ? Put ice in a plastic bag.  ? Place a towel between your skin and the bag or between your cast and the bag.  ? Leave the ice on for 20 minutes, 2-3 times a day.  · Gently move your fingers often to avoid stiffness and to lessen swelling.  · Raise (elevate) the injured area above the level of your heart while you are sitting or lying down.  Medicines  · Take over-the-counter and prescription medicines only as told by your doctor.  · Do not drive or use heavy machinery while taking prescription pain medicine.  General instructions  · Keep any bandages (dressings) dry until your doctor says they can be  taken off.  · Do exercises as told by your doctor or physical therapist.  · Do not wear rings on your injured finger.  · Keep all follow-up visits as told by your doctor. This is important.  Get help right away if:  · Your pain is not helped by medicine.  · Your bruising or swelling gets worse.  · Your splint or cast is damaged.  · You lose feeling in your finger.  · Your finger turns blue.  · Your finger feels colder than normal when you touch it.  · You have a fever.  Summary  · A finger sprain is a tear or stretch in a ligament in your finger. Ligaments are tissues that connect bones to each other.  · If you have a splint, loosen the splint if your fingers tingle, lose feeling (get numb), or turn cold and blue.  · Gently move your fingers often to avoid stiffness and to lessen swelling.  · If directed, put ice on the injured area.  This information is not intended to replace advice given to you by your health care provider. Make sure you discuss any questions you have with your health care provider.  Document Released: 01/20/2012 Document Revised: 11/30/2018 Document Reviewed: 03/09/2018  Elsevier Patient Education © 2020 [employer]

## 2021-10-18 NOTE — LETTER
[address] - [place] [address]  Phone:  [phone] - Fax:  [phone]   [facility] Progress Report and Disability Certification  Date of Service: 10/18/2021   No Show:  No  Date / Time of Next Visit: 10/21/2021@4:00PM   Claim Information   Patient Name: Brenna Abraham  Claim Number:     Employer:   [employer]  Date of Injury: 10/18/2021     Insurer / TPA:    ID / SSN:     Occupation: Reciver  Diagnosis: Diagnoses of Strain of flexor muscle, fascia and tendon of left index finger at wrist and hand level, initial encounter and Hand strain, left, initial encounter were pertinent to this visit.    Medical Information   Related to Industrial Injury? Yes    Subjective Complaints:  Date of injury 10/18/2021.  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  Employee was initially able to attempt to continue working for 2 hours yet was unable to continue thereafter from progressive worsening pain in the left palmar and left index finger which was worse with attempts at flexion hand of the hand and fingers and grasping objects.  Denies blunt traumatic injury to the hand.  Ice was applied with no resolution of the pain.     Objective Findings: Left hand palmar aspect: Tender to palpation palmar left hand, radial aspect, trace edema, no ecchymosis, no bony point tenderness  Left index finger: Diffusely tender to palpation palmar aspect with decreased range of motion from guarding, opposition intact, pinch limited from guarding,  limited from guarding, trace edema throughout, no ecchymosis, no bony point tenderness, neurovascular intact throughout.   Pre-Existing  Condition(s):     Assessment:   Initial Visit    Status: Additional Care Required  Permanent Disability:No    Plan:   Comments:X-ray imaging, hand and wrist brace    Diagnostics: X-ray  Comments:X-ray left hand: No acute fracture    Comments:       Disability Information   Status: Released to Restricted Duty    From:  10/18/2021  Through: 10/21/2021 Restrictions are: Temporary   Physical Restrictions   Sitting:    Standing:    Stooping:    Bending:      Squatting:    Walking:    Climbing:    Pushing:      Pulling:    Other:    Reaching Above Shoulder (L):   Reaching Above Shoulder (R):       Reaching Below Shoulder (L):    Reaching Below Shoulder (R):      Not to exceed Weight Limits   Carrying(hrs):   Weight Limit(lb):   Lifting(hrs):   Weight  Limit(lb):     Comments: No use of left upper extremity    Repetitive Actions   Hands: i.e. Fine Manipulations from Grasping:     Feet: i.e. Operating Foot Controls:     Driving / Operate Machinery:     Health Care Provider’s Original or Electronic Signature  Roberto Guerrier M.D. Health Care Provider’s Original or Electronic Signature    Daniel Hall MD         Clinic Name / Location: [address]  [place] [address] Clinic Phone Number: Dept: [phone]   Appointment Time: 2:35 Pm Visit Start Time: 3:14 PM   Check-In Time:  2:42 Pm Visit Discharge Time:     Original-Treating Physician or Chiropractor    Page 2-Insurer/TPA    Page 3-Employer    Page 4-Employee

## 2021-10-18 NOTE — PROGRESS NOTES
Subjective:   Brenna Abraham is a 41 y.o. female who presents for Hand Injury (WC, L hand/index finger injury, cant move finger, constant pain, happened pulling boxes happened today )    Date of injury 10/18/2021.  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  Employee was initially able to attempt to continue working for 2 hours yet was unable to continue thereafter from progressive worsening pain in the left palmar and left index finger which was worse with attempts at flexion hand of the hand and fingers and grasping objects.  Denies blunt traumatic injury to the hand.  Ice was applied with no resolution of the pain.     See the D 39 and C4 form    Hand Injury      PMH:  has a past medical history of Cancer (HCC) (1/2016).  MEDS: No current outpatient medications on file.  ALLERGIES:   Allergies   Allergen Reactions   • Latex Rash and Itching     Rash and itching at contact site   • Tape      ALLTape and bandaids Cause blisters, NO PAPER TAPE     SURGHX:   Past Surgical History:   Procedure Laterality Date   • TISSUE EXPANDER PLACE/REMOVE Right 5/3/2018    Procedure: TISSUE EXPANDER PLACE/REMOVE - REMOVAL;  Surgeon: Jose L Huff M.D.;  Location: SURGERY SAME DAY [facility];  Service: Plastics   • PELVISCOPY Bilateral 4/12/2016    Procedure: PELVISCOPY, BSO, LYSIS OF ADHESIONS;  Surgeon: Ranjit Gr M.D.;  Location: SURGERY SAME DAY [facility];  Service:    • IRRIGATION & DEBRIDEMENT GENERAL Left 3/3/2016    Procedure: IRRIGATION & DEBRIDEMENT GENERAL OF MASTECTOMY SKIN FLAP;  Surgeon: Jose L Huff M.D.;  Location: SURGERY SAME DAY [facility];  Service:    • TISSUE EXPANDER PLACE/REMOVE Left 3/3/2016    Procedure: TISSUE  "EXPANDER PLACE/REMOVE FOR REMOVAL AND CLOSURE WITH LOCAL FLAPS;  Surgeon: Jose L Huff M.D.;  Location: SURGERY SAME DAY [facility];  Service:    • MASTECTOMY Bilateral 1/19/2016    Procedure: MASTECTOMY;  Surgeon: Tamika Cee M.D.;  Location: SURGERY SAME DAY [facility];  Service:    • NODE BIOPSY SENTINEL Right 1/19/2016    Procedure: NODE BIOPSY SENTINEL;  Surgeon: Tamika Cee M.D.;  Location: SURGERY SAME DAY [facility];  Service:    • AXILLARY NODE DISSECTION Right 1/19/2016    Procedure: AXILLARY NODE DISSECTION possible conversion;  Surgeon: Tamika Cee M.D.;  Location: SURGERY SAME DAY [facility];  Service:    • CATH REMOVAL Right 1/19/2016    Procedure: CATH REMOVAL port;  Surgeon: Tamika Cee M.D.;  Location: SURGERY SAME DAY [facility];  Service:    • TISSUE EXPANDER PLACE/REMOVE Bilateral 1/19/2016    Procedure: TISSUE EXPANDER PLACE/REMOVE using acellular dermal matrix;  Surgeon: JoseL Huff M.D.;  Location: SURGERY SAME DAY [facility];  Service:    • PRIMARY C SECTION      FOUR     SOCHX:  reports that she quit smoking about 6 years ago. Her smoking use included cigarettes. She has a 0.03 pack-year smoking history. She has never used smokeless tobacco. She reports current alcohol use. She reports that she does not use drugs.  FH:   Family History   Problem Relation Age of Onset   • Diabetes Mother    • Hypertension Mother    • Heart Attack Mother    • Alcohol/Drug Father    • Heart Attack Brother    • Cancer Maternal Grandmother         LUNG/SMOKER   • Stroke Neg Hx      Review of Systems   Neurological: Negative for sensory change and focal weakness.   All other systems reviewed and are negative.       Objective:   /78 (BP Location: Right arm, Patient Position: Sitting, BP Cuff Size: Adult)   Pulse 66   Temp 36.6 °C (97.8 °F) (Temporal)   Resp 16   Ht 1.626 m (5' 4\")   Wt 78 kg (172 lb)   SpO2 98%   BMI 29.52 kg/m²   Physical " Exam  Vitals and nursing note reviewed.   Constitutional:       General: She is not in acute distress.     Appearance: She is well-developed.   HENT:      Head: Normocephalic and atraumatic.      Right Ear: External ear normal.      Left Ear: External ear normal.      Nose: Nose normal.      Mouth/Throat:      Mouth: Mucous membranes are moist.   Eyes:      Conjunctiva/sclera: Conjunctivae normal.   Cardiovascular:      Rate and Rhythm: Normal rate.   Pulmonary:      Effort: Pulmonary effort is normal. No respiratory distress.      Breath sounds: Normal breath sounds.   Abdominal:      General: There is no distension.   Musculoskeletal:         General: Normal range of motion.   Skin:     General: Skin is warm and dry.   Neurological:      General: No focal deficit present.      Mental Status: She is alert and oriented to person, place, and time. Mental status is at baseline.      Gait: Gait (gait at baseline) normal.   Psychiatric:         Judgment: Judgment normal.       Left hand palmar aspect: Tender to palpation palmar left hand, radial aspect, trace edema, no ecchymosis, no bony point tenderness  Left index finger: Diffusely tender to palpation palmar aspect with decreased range of motion from guarding, opposition intact, pinch limited from guarding,  limited from guarding, trace edema throughout, no ecchymosis, no bony point tenderness, neurovascular intact throughout.       DX-HAND 3+ LEFT  Order: [id number]  Status:  Final result   Visible to patient:  No (scheduled for 10/19/2021  1:47 PM) Next appt:  None Dx:  Hand strain, left, initial encounter;...   0 Result Notes  Details    Reading Physician Reading Date Result Priority   Matty Maher M.D.  [phone] 10/18/2021 Urgent Care   Narrative & Impression     10/18/2021 3:27 PM     HISTORY/REASON FOR EXAM:  Atraumatic Pain/Swelling/Deformity.  Hand pain after injury     TECHNIQUE/EXAM DESCRIPTION AND NUMBER OF VIEWS:  3 views of the LEFT  hand.     COMPARISON: None     FINDINGS:  There is no fracture or dislocation.  The visualized osseous structures are in anatomic alignment.  The joint spaces are preserved.  Bone mineralization is age-appropriate..        IMPRESSION:     No acute osseous abnormality.        Exam Ended: 10/18/21  3:39 PM Last Resulted: 10/18/21  3:45 PM                 Assessment/Plan:   1. Strain of flexor muscle, fascia and tendon of left index finger at wrist and hand level, initial encounter  - DX-HAND 3+ LEFT; Future  - Wrist Splint  - Ice Pack    2. Hand strain, left, initial encounter  - DX-HAND 3+ LEFT; Future  - Wrist Splint  - Ice Pack        See the D 39 and C4 form.  Recommend over-the-counter ibuprofen, ice application as needed, hand wrist brace, recommend return to clinic in 3 days for recheck and anticipate advancement of work activity.    Please note that this dictation was created using voice recognition software. I have worked with consultants from the vendor as well as technical experts from [facility] to optimize the interface. I have made every reasonable attempt to correct obvious errors, but I expect that there are errors of grammar and possibly content that I did not discover before finalizing the note.

## 2021-10-18 NOTE — LETTER
"EMPLOYEE’S CLAIM FOR COMPENSATION/ REPORT OF INITIAL TREATMENT  FORM C-4    EMPLOYEE’S CLAIM - PROVIDE ALL INFORMATION REQUESTED   First Name  Brenna Last Name  Leigh Birthdate                    1980                Sex  female Claim Number (Insurer’s Use Only)    Home Address  [address] Age  41 y.o. 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 (if applicable)  Pulling Some boxes    How did this injury or occupational disease occur? (Be specific an answer in detail. Use additional sheet if necessary)  I was pulling some boxes on a location and I feel like something pull from my hand   If you believe that you have an occupational disease, when did you first have knowledge of the disability and it relationship to your employment?  N/A Witnesses to the Accident  N/A      Nature of Injury or Occupational Disease  Strain  Part(s) of Body Injured or Affected  Hand (L), N/A, N/A    I certify that the above is true and correct to the best of my knowledge and that I have provided this information in order to " obtain the benefits of [place]  I hereby authorize any physician, chiropractor, surgeon, practitioner, or other person, any hospital, including [facility] or [facility] hospital, any medical service organization, any insurance company, or other institution or organization to release to each other, any medical or other information, including benefits paid or payable, pertinent to this injury or disease, except information relative to diagnosis, treatment and/or counseling for AIDS, psychological conditions, alcohol or controlled substances, for which I must give specific authorization.  A Photostat of this authorization shall be as valid as the original.     Date   Place Employee’s Original or  *Electronic Signature   THIS REPORT MUST BE COMPLETED AND MAILED WITHIN 3 WORKING DAYS OF TREATMENT   Place  [facility]  Name of Facility  [place]   Date  10/18/2021 Diagnosis and Description of Injury or Occupational Disease  (S66.111A) Strain of flexor muscle, fascia and tendon of left index finger at wrist and hand level, initial encounter  (S66.912A) Hand strain, left, initial encounter Is there evidence the injured employee was under the influence of alcohol and/or another controlled substance at the time of accident?  ? No ? Yes (if yes, please explain)    Hour  3:14 PM   Diagnoses of Strain of flexor muscle, fascia and tendon of left index finger at wrist and hand level, initial encounter and Hand strain, left, initial encounter were pertinent to this visit. No   Treatment  Hand and wrist splint, ice  Have you advised the patient to remain off work five days or     more?    X-Ray Findings  Negative  Comments:X-ray left hand: No fracture   ? Yes Indicate dates:   From   To      From information given by the employee, together with medical evidence, can        you directly connect  "this injury or occupational disease as job incurred?  No ? No If no, is the injured employee capable of:  ? full duty  No ? modified duty  Yes   Is additional medical care by a physician indicated?  Yes If Modified Duty, Specify any Limitations / Restrictions  No use of left upper extremity   Do you know of any previous injury or disease contributing to this condition or occupational disease?  ? Yes ? No (Explain if yes)                          No   Date  10/18/2021 Print Health Care Provider's   Roberto Guerrier M.D. I certify the employer’s copy of  this form was mailed on:   Address  [address] [place] [address]. [address] Insurer’s Use Only     [facility] Zip  [address]    Provider’s Tax ID Number  [id number] Telephone  Dept: [phone]             Health Care Provider’s Original or Electronic Signature  e-ROBERTO Hood M.D. Degree (MD,DO, DC,PA-C,APRN)   MD      * Complete and attach Release of Information (Form C-4A) when injured employee signs C-4 Form electronically  ORIGINAL - TREATING HEALTHCARE PROVIDER PAGE 2 - INSURER/TPA PAGE 3 - EMPLOYER PAGE 4 - EMPLOYEE             Form C-4 (rev.08/21)           BRIEF DESCRIPTION OF RIGHTS AND BENEFITS  (Pursuant to [place] 616[place])    Notice of Injury or Occupational Disease (Incident Report Form C-1): If an injury or occupational disease (OD) arises out of and in the course of employment, you must provide written notice to your employer as soon as practicable, but no later than 7 days after the accident or OD. Your employer shall maintain a sufficient supply of the required forms.    Claim for Compensation (Form C-4): If medical treatment is sought, the form C-4 is available at the place of initial treatment. A completed \"Claim for Compensation\" (Form C-4) must be filed within 90 days after an accident or OD. The treating physician or chiropractor must, within 3 working days after treatment, complete and mail to the employer, the employer's insurer and " third-party , the Claim for Compensation.    Medical Treatment: If you require medical treatment for your on-the-job injury or OD, you may be required to select a physician or chiropractor from a list provided by your workers’ compensation insurer, if it has contracted with an Organization for Managed Care (MCO) or Preferred Provider Organization (PPO) or providers of health care. If your employer has not entered into a contract with an MCO or PPO, you may select a physician or chiropractor from the Panel of Physicians and Chiropractors. Any medical costs related to your industrial injury or OD will be paid by your insurer.    Temporary Total Disability (TTD): If your doctor has certified that you are unable to work for a period of at least 5 consecutive days, or 5 cumulative days in a 20-day period, or places restrictions on you that your employer does not accommodate, you may be entitled to TTD compensation.    Temporary Partial Disability (TPD): If the wage you receive upon reemployment is less than the compensation for TTD to which you are entitled, the insurer may be required to pay you TPD compensation to make up the difference. TPD can only be paid for a maximum of 24 months.    Permanent Partial Disability (PPD): When your medical condition is stable and there is an indication of a PPD as a result of your injury or OD, within 30 days, your insurer must arrange for an evaluation by a rating physician or chiropractor to determine the degree of your PPD. The amount of your PPD award depends on the date of injury, the results of the PPD evaluation, your age and wage.    Permanent Total Disability (PTD): If you are medically certified by a treating physician or chiropractor as permanently and totally disabled and have been granted a PTD status by your insurer, you are entitled to receive monthly benefits not to exceed 66 2/3% of your average monthly wage. The amount of your PTD payments is subject to  reduction if you previously received a lump-sum PPD award.    Vocational Rehabilitation Services: You may be eligible for vocational rehabilitation services if you are unable to return to the job due to a permanent physical impairment or permanent restrictions as a result of your injury or occupational disease.    Transportation and Per Mateo Reimbursement: You may be eligible for travel expenses and per mateo associated with medical treatment.    Reopening: You may be able to reopen your claim if your condition worsens after claim closure.     Appeal Process: If you disagree with a written determination issued by the insurer or the insurer does not respond to your request, you may appeal to the Department of Administration, , by following the instructions contained in your determination letter. You must appeal the determination within 70 days from the date of the determination letter at [address], [address], [place], [place] [address], or [address], [address], [place] [address]. If you disagree with the  decision, you may appeal to the Department of Administration, . You must file your appeal within 30 days from the date of the  decision letter at [address], [address], [place], [place] [address], or [address], [address], [place], [place] [address]. If you disagree with a decision of an , you may file a petition for judicial review with the District Court. You must do so within 30 days of the Appeal Officer’s decision. You may be represented by an  at your own expense or you may contact the [facility] for possible representation.    [place]  for Injured Workers (NAIW): If you disagree with a  decision, you may request that NAIW represent you without charge at an  Hearing. For information regarding denial of benefits, you may contact the [facility] at: [address], [address], [place] [address], [phone], or [address], [address], [place] [address], [phone]    To File a Complaint with the Division: If you wish to file a complaint with the  of the Division of Industrial Relations (DIR),  please contact the Workers’ Compensation Section, [address], [address], [place], [place] [address], telephone [phone]) [phone], or [address], [address], [place], [place] [address], telephone [phone].    For assistance with Workers’ Compensation Issues: You may contact the [facility] Office for Consumer Health Assistance, [address], [address] [facility] [address], [place] [address], Toll Free 1-958.979.2851, Web site: [email or web address]/Programs/RICHARD E-mail: [email or web address]              __________________________________________________________________                                    _________________            Employee Name / Signature                                                                                                                            Date                                                                                                                                                                                                                              D-2 (rev. 10/20)

## 2021-10-21 ENCOUNTER — OCCUPATIONAL MEDICINE (OUTPATIENT)
Dept: URGENT CARE | Facility: PHYSICIAN GROUP | Age: 41
End: 2021-10-21
Payer: COMMERCIAL

## 2021-10-21 VITALS
RESPIRATION RATE: 18 BRPM | OXYGEN SATURATION: 98 % | WEIGHT: 172 LBS | BODY MASS INDEX: 29.37 KG/M2 | HEART RATE: 74 BPM | SYSTOLIC BLOOD PRESSURE: 116 MMHG | HEIGHT: 64 IN | TEMPERATURE: 97.9 F | DIASTOLIC BLOOD PRESSURE: 72 MMHG

## 2021-10-21 DIAGNOSIS — S66.111A: ICD-10-CM

## 2021-10-21 DIAGNOSIS — S66.912A HAND STRAIN, LEFT, INITIAL ENCOUNTER: ICD-10-CM
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
## 2021-10-21 PROCEDURE — 99214 OFFICE O/P EST MOD 30 MIN: CPT | Performed by: STUDENT IN AN ORGANIZED HEALTH CARE EDUCATION/TRAINING PROGRAM

## 2021-10-21 NOTE — LETTER
[address] - [place] [address]  Phone:  [phone] - Fax:  [phone]   [facility] Progress Report and Disability Certification  Date of Service: 10/21/2021   No Show:  No  Date / Time of Next Visit: 10/28/2021@ 8:00 AM   Claim Information   Patient Name: Brenna Abraham  Claim Number:     Employer:   [employer] Date of Injury: 10/18/2021     Insurer / TPA: [employer]  ID / SSN:     Occupation: Reciver  Diagnosis: There were no encounter diagnoses.    Medical Information   Related to Industrial Injury? Yes    Subjective Complaints:  Date of injury 10/18/2021.  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  Employee was initially able to attempt to continue working for 2 hours yet was unable to continue thereafter from progressive worsening pain in the left palmar and left index finger which was worse with attempts at flexion hand of the hand and fingers and grasping objects.  Denies blunt traumatic injury to the hand.  Ice was applied with no resolution of the pain.    Todays date: 10/21/2021 = second visit.  Patient the pain and swelling has been improved.  She has been wearing the wrist splint as instructed.  Additionally she has been using some ibuprofen which helps.  She points to the second metacarpal, volar surface as a source of continued discomfort.  Says she has pain with localized palpation in the region and making a fist.  Patient also reports she is now experiencing some numbness along the ulnar margins of her hand; thinks it could possibly due to the wrist splint.   Objective Findings: Gen: no acute distress, normal voice  Skin: dry,  intact, moist mucosal membranes  Head: Atraumatic, normocephalic  Psych: normal affect, normal judgement, alert, awake  Musculoskeletal: Left hand: No erythema, ecchymosis or edema.  Full range of motion with mild discernible discomfort on the extreme ends of MCP J flexion.  No scissoring.  TTP along the middle third of the second metacarpal without any crepitus or step-off.  Distal sensation and motor fully intact.       Pre-Existing Condition(s):     Assessment:   Condition Improved    Status: Additional Care Required  Permanent Disability:No    Plan:      Diagnostics:      Comments:       Disability Information   Status: Released to Restricted Duty    From:  10/21/2021  Through: 10/28/2021 Restrictions are: Temporary   Physical Restrictions   Sitting:    Standing:    Stooping:    Bending:      Squatting:    Walking:    Climbing:    Pushing:      Pulling:    Other:    Reaching Above Shoulder (L):   Reaching Above Shoulder (R):       Reaching Below Shoulder (L):    Reaching Below Shoulder (R):      Not to exceed Weight Limits   Carrying(hrs):   Weight Limit(lb): < or = to 10 pounds  Comments:Left hand only Lifting(hrs):   Weight  Limit(lb): < or = to 10 pounds  Comments:Left hand only   Comments: Symptoms have improved but she still has localized TTP along the volar surface of the second metacarpal.  Additionally she was experiencing some paresthesias along the ulnar margins of her hand which was likely contributed to the wrist splint.  I personally reviewed imaging performed 3 days ago which was unremarkable.  -Restrictions per D 39  -Discontinue use of her splint  -Ibuprofen 800 mg 3 times daily as needed and/or Tylenol 1000 mg 3 times daily as needed  -Follow-up in 1 week for reevaluation    Repetitive Actions   Hands: i.e. Fine Manipulations from Graspin hrs/day  Comments:Left hand only   Feet: i.e. Operating Foot Controls:     Driving / Operate Machinery:     Health Care Provider’s Original or Electronic  Signature  Roberto Miranda D.O. Health Care Provider’s Original or Electronic Signature    Daniel Hall MD         Clinic Name / Location: [address]  [place] [address] Clinic Phone Number: Dept: [phone]   Appointment Time: 4:00 Pm Visit Start Time: 4:17 PM   Check-In Time:  4:17 Pm Visit Discharge Time: 6:00PM   Original-Treating Physician or Chiropractor    Page 2-Insurer/TPA    Page 3-Employer    Page 4-Employee

## 2021-10-21 NOTE — LETTER
[address] - [place] [address]  Phone:  [phone] - Fax:  [phone]   [facility] Progress Report and Disability Certification  Date of Service: 10/21/2021   No Show:  No  Date / Time of Next Visit: 10/28/2021@ 8:00 AM   Claim Information   Patient Name: Brenna Abraham  Claim Number:     Employer:   [employer] Date of Injury: 10/18/2021     Insurer / TPA: [employer]  ID / SSN:     Occupation: Reciver  Diagnosis: Diagnoses of Strain of flexor muscle, fascia and tendon of left index finger at wrist and hand level, subsequent encounter, Hand strain, left, subsequent encounter, and Work related injury were pertinent to this visit.    Medical Information   Related to Industrial Injury? Yes    Subjective Complaints:  Date of injury 10/18/2021.  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  Employee was initially able to attempt to continue working for 2 hours yet was unable to continue thereafter from progressive worsening pain in the left palmar and left index finger which was worse with attempts at flexion hand of the hand and fingers and grasping objects.  Denies blunt traumatic injury to the hand.  Ice was applied with no resolution of the pain.    Todays date: 10/21/2021 = second visit.  Patient the pain and swelling has been improved.  She has been wearing the wrist splint as instructed.  Additionally she has been using some ibuprofen which helps.  She points to the second metacarpal, volar surface as a source of continued discomfort.  Says she has pain with localized palpation in the region and making a fist.  Patient also reports she is now  experiencing some numbness along the ulnar margins of her hand; thinks it could possibly due to the wrist splint.   Objective Findings: Gen: no acute distress, normal voice  Skin: dry, intact, moist mucosal membranes  Head: Atraumatic, normocephalic  Psych: normal affect, normal judgement, alert, awake  Musculoskeletal: Left hand: No erythema, ecchymosis or edema.  Full range of motion with mild discernible discomfort on the extreme ends of MCP J flexion.  No scissoring.  TTP along the middle third of the second metacarpal without any crepitus or step-off.  Distal sensation and motor fully intact.       Pre-Existing Condition(s):     Assessment:   Condition Improved    Status: Additional Care Required  Permanent Disability:No    Plan:      Diagnostics:      Comments:       Disability Information   Status: Released to Restricted Duty    From:  10/21/2021  Through: 10/28/2021 Restrictions are: Temporary   Physical Restrictions   Sitting:    Standing:    Stooping:    Bending:      Squatting:    Walking:    Climbing:    Pushing:      Pulling:    Other:    Reaching Above Shoulder (L):   Reaching Above Shoulder (R):       Reaching Below Shoulder (L):    Reaching Below Shoulder (R):      Not to exceed Weight Limits   Carrying(hrs):   Weight Limit(lb): < or = to 10 pounds  Comments:Left hand only Lifting(hrs):   Weight  Limit(lb): < or = to 10 pounds  Comments:Left hand only   Comments: Symptoms have improved but she still has localized TTP along the volar surface of the second metacarpal.  Additionally she was experiencing some paresthesias along the ulnar margins of her hand which was likely contributed to the wrist splint.  I personally reviewed imaging performed 3 days ago which was unremarkable.  -Restrictions per D 39  -Discontinue use of her splint  -Ibuprofen 800 mg 3 times daily as needed and/or Tylenol 1000 mg 3 times daily as needed  -Follow-up in 1 week for reevaluation    Repetitive Actions   Hands: i.e. Fine  Manipulations from Graspin hrs/day  Comments:Left hand only   Feet: i.e. Operating Foot Controls:     Driving / Operate Machinery:     Health Care Provider’s Original or Electronic Signature  Roberto Miranda D.O. Health Care Provider’s Original or Electronic Signature    Daniel Hall MD         Clinic Name / Location: [address]  [place] [address] Clinic Phone Number: Dept: [phone]   Appointment Time: 4:00 Pm Visit Start Time: 4:17 PM   Check-In Time:  4:17 Pm Visit Discharge Time: 6:05 PM    Original-Treating Physician or Chiropractor    Page 2-Insurer/TPA    Page 3-Employer    Page 4-Employee

## 2021-10-22 NOTE — PROGRESS NOTES
"Subjective:     Brenna Abraham is a 41 y.o. female who presents for Work-Related Injury (DOI 10/18/2021 left hand index finger. she is wearing her brace, since it is uncomfortable. her lateral side on her hand feels a little numb. )      Date of injury 10/18/2021.  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  Employee was initially able to attempt to continue working for 2 hours yet was unable to continue thereafter from progressive worsening pain in the left palmar and left index finger which was worse with attempts at flexion hand of the hand and fingers and grasping objects.  Denies blunt traumatic injury to the hand.  Ice was applied with no resolution of the pain.    Todays date: 10/21/2021 = second visit.  Patient the pain and swelling has been improved.  She has been wearing the wrist splint as instructed.  Additionally she has been using some ibuprofen which helps.  She points to the second metacarpal, volar surface as a source of continued discomfort.  Says she has pain with localized palpation in the region and making a fist.  Patient also reports she is now experiencing some numbness along the ulnar margins of her hand; thinks it could possibly due to the wrist splint.    PMH:   No pertinent past medical history to this problem  MEDS:  Medications were reviewed in EMR  ALLERGIES:  Allergies were reviewed in EMR  FH:   No pertinent family history to this problem       Objective:     /72   Pulse 74   Temp 36.6 °C (97.9 °F) (Temporal)   Resp 18   Ht 1.626 m (5' 4\")   Wt 78 kg (172 lb)   SpO2 98%   BMI 29.52 kg/m²     Gen: no acute distress, normal voice  Skin: dry, intact, moist mucosal membranes  Head: Atraumatic, " normocephalic  Psych: normal affect, normal judgement, alert, awake  Musculoskeletal: Left hand: No erythema, ecchymosis or edema.  Full range of motion with mild discernible discomfort on the extreme ends of MCP J flexion.  No scissoring.  TTP along the middle third of the second metacarpal without any crepitus or step-off.  Distal sensation and motor fully intact.        Assessment/Plan:       1. Strain of flexor muscle, fascia and tendon of left index finger at wrist and hand level, subsequent encounter    2. Hand strain, left, subsequent encounter    3. Work related injury    • Released to Restricted Duty FROM 10/21/2021 TO 10/28/2021  Symptoms have improved but she still has localized TTP along the volar surface of the second metacarpal.  Additionally she was experiencing some paresthesias along the ulnar margins of her hand which was likely contributed to the wrist splint.  I personally reviewed imaging performed 3 days ago which was unremarkable.  -Restrictions per D 39  -Discontinue use of her splint  -Ibuprofen 800 mg 3 times daily as needed and/or Tylenol 1000 mg 3 times daily as needed  -Follow-up in 1 week for reevaluation       Differential diagnosis, natural history, supportive care, and indications for immediate follow-up discussed.

## 2021-10-28 ENCOUNTER — HOSPITAL ENCOUNTER (OUTPATIENT)
Dept: LAB | Facility: MEDICAL CENTER | Age: 41
End: 2021-10-28
Attending: OBSTETRICS & GYNECOLOGY
Payer: COMMERCIAL

## 2021-10-28 ENCOUNTER — OCCUPATIONAL MEDICINE (OUTPATIENT)
Dept: URGENT CARE | Facility: PHYSICIAN GROUP | Age: 41
End: 2021-10-28
Payer: COMMERCIAL

## 2021-10-28 VITALS
RESPIRATION RATE: 14 BRPM | HEART RATE: 74 BPM | SYSTOLIC BLOOD PRESSURE: 124 MMHG | BODY MASS INDEX: 29.52 KG/M2 | TEMPERATURE: 97.9 F | WEIGHT: 172 LBS | OXYGEN SATURATION: 98 % | DIASTOLIC BLOOD PRESSURE: 78 MMHG

## 2021-10-28 DIAGNOSIS — S66.111A: ICD-10-CM

## 2021-10-28 DIAGNOSIS — S66.912A HAND STRAIN, LEFT, INITIAL ENCOUNTER: ICD-10-CM
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
## 2021-10-28 PROCEDURE — 88175 CYTOPATH C/V AUTO FLUID REDO: CPT

## 2021-10-28 PROCEDURE — 99213 OFFICE O/P EST LOW 20 MIN: CPT | Performed by: NURSE PRACTITIONER

## 2021-10-28 RX ORDER — ESCITALOPRAM OXALATE 10 MG/1
10 TABLET ORAL
COMMUNITY
Start: 2021-10-25 | End: 2021-11-24

## 2021-10-28 ASSESSMENT — ENCOUNTER SYMPTOMS
FEVER: 0
WEAKNESS: 0
CHILLS: 0
SENSORY CHANGE: 1
MYALGIAS: 1
TINGLING: 1
FALLS: 0
BRUISES/BLEEDS EASILY: 0

## 2021-10-28 NOTE — PROGRESS NOTES
Subjective     Brenna Abraham is a 41 y.o. female who presents with Work-Related Injury (Follow up.  Pain and numbness in left hand that travels up the arm.)      Date of injury 10/18/2021.  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  Employee was initially able to attempt to continue working for 2 hours yet was unable to continue thereafter from progressive worsening pain in the left palmar and left index finger which was worse with attempts at flexion hand of the hand and fingers and grasping objects.  Denies blunt traumatic injury to the hand.  Ice was applied with no resolution of the pain.    Today: 10/28/21. 3rd encounter. No improvement from last visit. States still has numbness along ulnar region of 4th-5th digits of left hand. Has not been using wrist splint per last provider at last visit. Using Tylenol 3x/day, ice application with swelling as needed after work.        HPI  PMH: No pertinent past medical history to this problem  MEDS: Medications were reviewed in Epic  ALLERGIES: Allergies were reviewed in Epic  FH: No pertinent family history to this problem         Review of Systems   Constitutional: Negative for chills, fever and malaise/fatigue.   Musculoskeletal: Positive for joint pain and myalgias. Negative for falls.   Skin: Negative for itching and rash.   Neurological: Positive for tingling and sensory change. Negative for weakness.   Endo/Heme/Allergies: Does not bruise/bleed easily.   All other systems reviewed and are negative.             Objective     /78   Pulse 74   Temp 36.6 °C (97.9 °F) (Temporal)   Resp 14   Wt 78 kg (172 lb)   SpO2 98%   BMI 29.52 kg/m²      Physical Exam  Vitals reviewed.    Constitutional:       General: She is awake. She is not in acute distress.     Appearance: Normal appearance. She is well-developed. She is not ill-appearing, toxic-appearing or diaphoretic.   HENT:      Head: Normocephalic.   Eyes:      Pupils: Pupils are equal, round, and reactive to light.   Cardiovascular:      Rate and Rhythm: Normal rate.   Pulmonary:      Effort: Pulmonary effort is normal.   Musculoskeletal:      Left hand: Tenderness present. No swelling, deformity, lacerations or bony tenderness. Normal range of motion. Normal strength. Normal sensation. There is no disruption of two-point discrimination. Normal capillary refill. Normal pulse.   Skin:     General: Skin is warm and dry.      Coloration: Skin is not ashen, cyanotic, jaundiced, mottled, pale or sallow.      Findings: No abrasion, bruising, burn, ecchymosis, erythema, signs of injury, laceration, petechiae, rash or wound.   Neurological:      Mental Status: She is alert and oriented to person, place, and time.   Psychiatric:         Attention and Perception: Attention normal.         Mood and Affect: Mood normal.         Speech: Speech normal.         Behavior: Behavior normal. Behavior is cooperative.         A/O x 3. Skin p/w/d, skin sensation intact. Tenderness to touch at thenar region of left thumb. Able to make fist without difficulty but mild discomfort. CRT < 2 sec. Vitals WNL. Full range of motion of all fingers and wrist.                    Assessment & Plan        1. Strain of flexor muscle, fascia and tendon of left index finger at wrist and hand level, subsequent encounter  - REFERRAL TO SPORTS MEDICINE  - REFERRAL TO OCCUPATIONAL MEDICINE    2. Hand strain, left, subsequent encounter    - REFERRAL TO SPORTS MEDICINE  - REFERRAL TO OCCUPATIONAL MEDICINE    3. Work related injury    - REFERRAL TO SPORTS MEDICINE  - REFERRAL TO OCCUPATIONAL MEDICINE            -Work restrictions have not changed  -May continue Tylenol and ice  application as needed for pain/sweling  -Continue NOT to use wrist splint at this time if this causes more discomfort  -Will initiate referral to Sports Med for further evaluation of numbness in hand   -Will refer to Occupational Medicine at this time instead of returning to Urgent Care as there is no improvement

## 2021-10-28 NOTE — LETTER
[address] - [place] [address]  Phone:  [phone] - Fax:  [phone]   [facility] Progress Report and Disability Certification  Date of Service: 10/28/2021   No Show:  No  Date / Time of Next Visit: 11/11/2021@ 7:30 AM   Claim Information   Patient Name: Brenna Abraham  Claim Number:     Employer:   [employer] Date of Injury: 10/18/2021     Insurer / TPA: [employer]  ID / SSN:     Occupation: Reciver  Diagnosis: Diagnoses of Strain of flexor muscle, fascia and tendon of left index finger at wrist and hand level, subsequent encounter, Hand strain, left, subsequent encounter, and Work related injury were pertinent to this visit.    Medical Information   Related to Industrial Injury? Yes    Subjective Complaints:  Date of injury 10/18/2021.  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  Employee was initially able to attempt to continue working for 2 hours yet was unable to continue thereafter from progressive worsening pain in the left palmar and left index finger which was worse with attempts at flexion hand of the hand and fingers and grasping objects.  Denies blunt traumatic injury to the hand.  Ice was applied with no resolution of the pain.    Today: 10/28/21. 3rd encounter. No improvement from last visit. States still has numbness along ulnar region of 4th-5th digits of left hand. Has not been using wrist splint per last provider at last visit. Using Tylenol 3x/day, ice application with swelling as needed after work.      Objective Findings: A/O x 3. Skin p/w/d, skin sensation intact. Tenderness to touch at thenar region of left  thumb. Able to make fist without difficulty but mild discomfort. CRT < 2 sec. Vitals WNL. Full range of motion of all fingers and wrist.    Pre-Existing Condition(s):     Assessment:   Condition Same    Status: Discharged / Care Transfer  Permanent Disability:No    Plan:      Diagnostics:      Comments:       Disability Information   Status: Released to Restricted Duty    From:  10/28/2021  Through: 2021 Restrictions are: Temporary   Physical Restrictions   Sitting:    Standing:    Stooping:    Bending:      Squatting:    Walking:    Climbing:    Pushing:      Pulling:    Other:    Reaching Above Shoulder (L):   Reaching Above Shoulder (R):       Reaching Below Shoulder (L):    Reaching Below Shoulder (R):      Not to exceed Weight Limits   Carrying(hrs):   Weight Limit(lb): < or = to 10 pounds Lifting(hrs):   Weight  Limit(lb): < or = to 10 pounds   Comments: Work restrictions have not changed  May continue Tylenol and ice application as needed for pain/sweling  Continue NOT to use wrist splint at this time if this causes more discomfort  Will initiate referral to Sports Med for further evaluation of numbness in hand   Will refer to Occupational Medicine at this time instead of returning to Urgent Care as there is no improvement    Repetitive Actions   Hands: i.e. Fine Manipulations from Graspin hrs/day  Comments:left hand only   Feet: i.e. Operating Foot Controls:     Driving / Operate Machinery:     Health Care Provider’s Original or Electronic Signature  DIETER Matson. Health Care Provider’s Original or Electronic Signature    Daniel Hall MD         Clinic Name / Location: [address]  [place] [address] Clinic Phone Number: Dept: [phone]   Appointment Time: 8:00 Am Visit Start Time: 8:08 AM   Check-In Time:  7:38 Am Visit Discharge Time: 9:00 am   Original-Treating Physician or Chiropractor    Page 2-Insurer/TPA    Page 3-Employer     Page 4-Employee

## 2021-10-28 NOTE — LETTER
[address] - [place] [address]  Phone:  [phone] - Fax:  [phone]   [facility] Progress Report and Disability Certification  Date of Service: 10/28/2021   No Show:  No  Date / Time of Next Visit: 11/11/2021   Claim Information   Patient Name: Brenna Abraham  Claim Number:     Employer:   [employer] Date of Injury: 10/18/2021     Insurer / TPA: [employer]  ID / SSN:     Occupation: Reciver  Diagnosis: Diagnoses of Strain of flexor muscle, fascia and tendon of left index finger at wrist and hand level, subsequent encounter, Hand strain, left, subsequent encounter, and Work related injury were pertinent to this visit.    Medical Information   Related to Industrial Injury? Yes    Subjective Complaints:  Date of injury 10/18/2021.  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  Employee was initially able to attempt to continue working for 2 hours yet was unable to continue thereafter from progressive worsening pain in the left palmar and left index finger which was worse with attempts at flexion hand of the hand and fingers and grasping objects.  Denies blunt traumatic injury to the hand.  Ice was applied with no resolution of the pain.    Today: 10/28/21. 3rd encounter. No improvement from last visit. States still has numbness along ulnar region of 4th-5th digits of left hand. Has not been using wrist splint per last provider at last visit. Using Tylenol 3x/day, ice application with swelling as needed after work.      Objective Findings: A/O x 3. Skin p/w/d, skin sensation intact. Tenderness to touch at thenar region of left thumb. Able  to make fist without difficulty but mild discomfort. CRT < 2 sec. Vitals WNL. Full range of motion of all fingers and wrist.    Pre-Existing Condition(s):     Assessment:   Condition Same    Status: Discharged / Care Transfer  Permanent Disability:No    Plan:      Diagnostics:      Comments:       Disability Information   Status: Released to Restricted Duty    From:  10/28/2021  Through: 2021 Restrictions are: Temporary   Physical Restrictions   Sitting:    Standing:    Stooping:    Bending:      Squatting:    Walking:    Climbing:    Pushing:      Pulling:    Other:    Reaching Above Shoulder (L):   Reaching Above Shoulder (R):       Reaching Below Shoulder (L):    Reaching Below Shoulder (R):      Not to exceed Weight Limits   Carrying(hrs):   Weight Limit(lb): < or = to 10 pounds Lifting(hrs):   Weight  Limit(lb): < or = to 10 pounds   Comments: Work restrictions have not changed  May continue Tylenol and ice application as needed for pain/sweling  Continue NOT to use wrist splint at this time if this causes more discomfort  Will initiate referral to Sports Med for further evaluation of numbness in hand   Will refer to Occupational Medicine at this time instead of returning to Urgent Care as there is no improvement    Repetitive Actions   Hands: i.e. Fine Manipulations from Graspin hrs/day  Comments:left hand only   Feet: i.e. Operating Foot Controls:     Driving / Operate Machinery:     Health Care Provider’s Original or Electronic Signature  DIETER Matson. Health Care Provider’s Original or Electronic Signature    Daniel Hall MD         Clinic Name / Location: [address]  [place] [address] Clinic Phone Number: Dept: [phone]   Appointment Time: 8:00 Am Visit Start Time: 8:08 AM   Check-In Time:  7:38 Am Visit Discharge Time: 11:45 AM   Original-Treating Physician or Chiropractor    Page 2-Insurer/TPA    Page 3-Employer    Page  4-Employee

## 2021-10-29 LAB — CYTOLOGY REG CYTOL: NORMAL

## 2021-11-11 ENCOUNTER — APPOINTMENT (OUTPATIENT)
Dept: RADIOLOGY | Facility: IMAGING CENTER | Age: 41
End: 2021-11-11
Attending: PREVENTIVE MEDICINE
Payer: COMMERCIAL

## 2021-11-11 ENCOUNTER — OCCUPATIONAL MEDICINE (OUTPATIENT)
Dept: OCCUPATIONAL MEDICINE | Facility: CLINIC | Age: 41
End: 2021-11-11
Payer: COMMERCIAL

## 2021-11-11 VITALS
OXYGEN SATURATION: 98 % | RESPIRATION RATE: 14 BRPM | BODY MASS INDEX: 29.37 KG/M2 | TEMPERATURE: 98.6 F | DIASTOLIC BLOOD PRESSURE: 70 MMHG | HEART RATE: 74 BPM | WEIGHT: 172 LBS | SYSTOLIC BLOOD PRESSURE: 110 MMHG | HEIGHT: 64 IN

## 2021-11-11 DIAGNOSIS — S67.42XD CRUSHING INJURY OF LEFT WRIST AND HAND, SUBSEQUENT ENCOUNTER: ICD-10-CM

## 2021-11-11 PROCEDURE — 99203 OFFICE O/P NEW LOW 30 MIN: CPT | Performed by: PREVENTIVE MEDICINE

## 2021-11-11 PROCEDURE — 73110 X-RAY EXAM OF WRIST: CPT | Mod: TC,LT | Performed by: PREVENTIVE MEDICINE

## 2021-11-11 RX ORDER — DICLOFENAC SODIUM 75 MG/1
75 TABLET, DELAYED RELEASE ORAL 2 TIMES DAILY
Qty: 60 TABLET | Refills: 0 | Status: SHIPPED | OUTPATIENT
Start: 2021-11-11 | End: 2021-12-06

## 2021-11-11 ASSESSMENT — PAIN SCALES - GENERAL: PAINLEVEL: 7=MODERATE-SEVERE PAIN

## 2021-11-11 NOTE — PROGRESS NOTES
"Subjective:     Brenna Abraham is a 41 y.o. 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 HUSEYIN: a large 40 pound box fell and struck the left hand and index finger. She was seen in [facility], XR of the hand was negative for acute findings.  Patient states that pain is mostly on the radial side of the wrist and hand.  The area is tender to touch.  She gets worsening pain with frequent use of the hand or with heavy lifting.  She is currently working light duty.  She gets some numbness and tingling on the ulnar side of the hand.  She states she feels like she gets radiating pain up the arm into the neck.  She stopped using the wrist brace because is making symptoms worsen.  She is just taking Advil and Tylenol for relief.  Denies prior injuries to the left hand/wrist.    ROS: All systems were reviewed on intake form, form was reviewed and signed. See scanned documents in media. Pertinent positives and negatives included in HPI.    PMH: No pertinent past medical history to this problem  MEDS: Medications were reviewed in Epic  ALLERGIES:   Allergies   Allergen Reactions   • Latex Rash and Itching     Rash and itching at contact site   • Tape      ALLTape and bandaids Cause blisters, NO PAPER TAPE     SOCHX: Works as a  at [employer]  FH: No pertinent family history to this problem       Objective:     /70   Pulse 74   Temp 37 °C (98.6 °F) (Temporal)   Resp 14   Ht 1.626 m (5' 4\")   Wt 78 kg (172 lb)   SpO2 98%   BMI 29.52 kg/m²     Constitutional: Patient is in no acute distress. Appears well-developed and well-nourished.   HENT: Normocephalic and atraumatic. EOM are normal. No scleral icterus.   Cardiovascular: Normal rate.    Pulmonary/Chest: Effort normal. No respiratory distress.   Neurological: Patient is alert and oriented to person, place, and time. "   Skin: Skin is warm and dry.   Psychiatric: Normal mood and affect. Behavior is normal.     Left hand/wrist: No gross deformity.  Diffuse tenderness over the radial wrist including the anatomic snuffbox.  Full range of motion but painful in wrist flexion/extension.  Strength testing shows global weakness due to pain.  Finkelstein's positive.  Tinel's negative.  Phalen's negative.    Repeat x-ray of left wrist: Negative for acute findings    Assessment/Plan:       1. Crushing injury of left wrist and hand, subsequent encounter  - DX-WRIST-COMPLETE 3+ LEFT; Future  - Referral to Hand Therapy  - diclofenac DR (VOLTAREN) 75 MG Tablet Delayed Response; Take 1 Tablet by mouth 2 times a day.  Dispense: 60 Tablet; Refill: 0    Released to Restricted Duty FROM 11/11/2021 TO 12/1/2021  Restrictions applied to left hand  Referral to hand therapy  Prescribe diclofenac 75 mg twice daily  Recommend heat/ice as needed  OTC muscle creams/ointments as needed  Restricted duty  Follow-up 3 weeks    Differential diagnosis, natural history, supportive care, and indications for immediate follow-up discussed.    Approximately 35 minutes were spent in reviewing notes, preparing for visit, obtaining history, exam and evaluation, patient counseling/education and post visit documentation/orders.

## 2021-11-11 NOTE — LETTER
[address],   [address] [place] [address]  Phone:  [phone] - Fax:  [phone]   Occupational Health [facility] Progress Report and Disability Certification  Date of Service: 11/11/2021   No Show:  No  Date / Time of Next Visit: 11/30/21 @ 7:30 AM    Claim Information   Patient Name: Brenna Abraham  Claim Number:     Employer:   [employer] Date of Injury: 10/18/2021     Insurer / TPA: [employer]  ID / SSN:     Occupation: Reciver  Diagnosis: The encounter diagnosis was Crushing injury of left wrist and hand, subsequent encounter.    Medical Information   Related to Industrial Injury? 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 HUSEYIN: a large 40 pound box fell and struck the left hand and index finger. She was seen in x3, XR of the hand was negative for acute findings.  Patient states that pain is mostly on the radial side of the wrist and hand.  The area is tender to touch.  She gets worsening pain with frequent use of the hand or with heavy lifting.  She is currently working light duty.  She gets some numbness and tingling on the ulnar side of the hand.  She states she feels like she gets radiating pain up the arm into the neck.  She stopped using the wrist brace because is making symptoms worsen.  She is just taking Advil and Tylenol for relief.  Denies prior injuries to the left hand/wrist.   Objective Findings: Left hand/wrist: No gross deformity.  Diffuse tenderness over the radial wrist including the anatomic snuffbox.  Full range of motion but painful in wrist flexion/extension.  Strength testing shows global weakness due to pain.  Finkelstein's positive.  Tinel's negative.  Phalen's negative.    Repeat x-ray of left wrist: Negative for acute findings   Pre-Existing Condition(s):     Assessment:   Condition Same     Status: Additional Care Required  Permanent Disability:No    Plan:      Diagnostics:      Comments:  Referral to hand therapy  Prescribe diclofenac 75 mg twice daily  Recommend heat/ice as needed  OTC muscle creams/ointments as needed  Restricted duty  Follow-up 3 weeks    Disability Information   Status: Released to Restricted Duty    From:  11/11/2021  Through: 12/1/2021 Restrictions are: Temporary   Physical Restrictions   Sitting:    Standing:    Stooping:    Bending:      Squatting:    Walking:    Climbing:    Pushing:  < or = to 2 hrs/day   Pulling:  < or = to 2 hrs/day Other:    Reaching Above Shoulder (L):   Reaching Above Shoulder (R):       Reaching Below Shoulder (L):    Reaching Below Shoulder (R):      Not to exceed Weight Limits   Carrying(hrs):   Weight Limit(lb): < or = to 10 pounds Lifting(hrs):   Weight  Limit(lb): < or = to 10 pounds   Comments: Restrictions applied to left hand    Repetitive Actions   Hands: i.e. Fine Manipulations from Grasping:     Feet: i.e. Operating Foot Controls:     Driving / Operate Machinery:     Health Care Provider’s Original or Electronic Signature  Joby Goodman D.O. Health Care Provider’s Original or Electronic Signature    Daniel Hall MD         Clinic Name / Location: [address],   [address] [place], [place] [address] [address] Clinic Phone Number: Dept: [phone]   Appointment Time: 7:30 Am Visit Start Time: 7:37 AM   Check-In Time:  7:29 Am Visit Discharge Time:  8:15 AM    Original-Treating Physician or Chiropractor    Page 2-Insurer/TPA    Page 3-Employer    Page 4-Employee

## 2021-11-30 ENCOUNTER — OCCUPATIONAL MEDICINE (OUTPATIENT)
Dept: OCCUPATIONAL MEDICINE | Facility: CLINIC | Age: 41
End: 2021-11-30
Payer: COMMERCIAL

## 2021-11-30 VITALS
OXYGEN SATURATION: 98 % | HEIGHT: 64 IN | WEIGHT: 175 LBS | DIASTOLIC BLOOD PRESSURE: 72 MMHG | RESPIRATION RATE: 14 BRPM | HEART RATE: 88 BPM | SYSTOLIC BLOOD PRESSURE: 118 MMHG | BODY MASS INDEX: 29.88 KG/M2 | TEMPERATURE: 97.9 F

## 2021-11-30 DIAGNOSIS — S67.42XD CRUSHING INJURY OF LEFT WRIST AND HAND, SUBSEQUENT ENCOUNTER: ICD-10-CM

## 2021-11-30 PROCEDURE — 99213 OFFICE O/P EST LOW 20 MIN: CPT | Performed by: PREVENTIVE MEDICINE

## 2021-11-30 NOTE — LETTER
[address],   [address] [place] [address]  Phone:  [phone] - Fax:  [phone]   Occupational Health [facility] Progress Report and Disability Certification  Date of Service: 11/30/2021   No Show:  No  Date / Time of Next Visit: 12/21/2021 @ 7:45am   Claim Information   Patient Name: Brenna Abraham  Claim Number:     Employer:   [employer] Date of Injury: 10/18/2021     Insurer / TPA: [employer]  ID / SSN:     Occupation: Reciver  Diagnosis: The encounter diagnosis was Crushing injury of left wrist and hand, subsequent encounter.    Medical Information   Related to Industrial Injury? 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 HUSEYIN: a large 40 pound box fell and struck the left hand and index finger. XR of the hand was negative for acute findings.  Patient states overall symptoms are about the same but the diclofenac does seem to help with symptoms.  She returned to work on light duty and feels comfortable continuing that.  She has not heard anything from the work comp insurance about her hand therapy or EMG being approved.   Objective Findings: Left hand/wrist: No gross deformity.  Area of pain over the radial wrist diffusely..  Full range of motion but mildly painful in wrist flexion/extension.     Pre-Existing Condition(s):     Assessment:   Condition Same    Status: Additional Care Required  Permanent Disability:No    Plan:      Diagnostics:      Comments:  Referral to hand therapy, pending approval  Continue diclofenac 75 mg twice daily  Recommend heat/ice as needed  OTC muscle creams/ointments as needed  Restricted duty  Follow-up 3 weeks    Disability Information   Status: Released to Restricted Duty    From:  11/30/2021  Through: 12/21/2021 Restrictions are: Temporary   Physical Restrictions   Sitting:    Standing:     Stooping:    Bending:      Squatting:    Walking:    Climbing:    Pushing:  < or = to 2 hrs/day   Pulling:  < or = to 2 hrs/day Other:    Reaching Above Shoulder (L):   Reaching Above Shoulder (R):       Reaching Below Shoulder (L):    Reaching Below Shoulder (R):      Not to exceed Weight Limits   Carrying(hrs):   Weight Limit(lb):   Lifting(hrs):   Weight  Limit(lb):     Comments: Restrictions applied to left hand    Repetitive Actions   Hands: i.e. Fine Manipulations from Grasping:     Feet: i.e. Operating Foot Controls:     Driving / Operate Machinery:     Health Care Provider’s Original or Electronic Signature  Joby Goodman D.O. Health Care Provider’s Original or Electronic Signature    Daniel Hall MD         Clinic Name / Location: [address] [place] [address] Clinic Phone Number: Dept: [phone]   Appointment Time: 7:30 Am Visit Start Time: 7:35 AM   Check-In Time:  7:30 Am Visit Discharge Time:  7:55am   Original-Treating Physician or Chiropractor    Page 2-Insurer/TPA    Page 3-Employer    Page 4-Employee

## 2021-11-30 NOTE — PROGRESS NOTES
"Subjective:     Brenna Abraham is a 41 y.o. 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 HUSEYIN: a large 40 pound box fell and struck the left hand and index finger. XR of the hand was negative for acute findings.  Patient states overall symptoms are about the same but the diclofenac does seem to help with symptoms.  She returned to work on light duty and feels comfortable continuing that.  She has not heard anything from the work comp insurance about her hand therapy or EMG being approved.    ROS         Objective:     /72   Pulse 88   Temp 36.6 °C (97.9 °F) (Temporal)   Resp 14   Ht 1.626 m (5' 4\")   Wt 79.4 kg (175 lb)   SpO2 98%   BMI 30.04 kg/m²     Constitutional: Patient is in no acute distress. Appears well-developed and well-nourished.   Cardiovascular: Normal rate.    Pulmonary/Chest: Effort normal. No respiratory distress.   Neurological: Patient is alert and oriented to person, place, and time.   Skin: Skin is warm and dry.   Psychiatric: Normal mood and affect. Behavior is normal.     Left hand/wrist: No gross deformity.  Area of pain over the radial wrist diffusely..  Full range of motion but mildly painful in wrist flexion/extension.      Assessment/Plan:       1. Crushing injury of left wrist and hand, subsequent encounter    Released to Restricted Duty FROM 11/30/2021 TO 12/21/2021  Restrictions applied to left hand    Referral to hand therapy, pending approval  Continue diclofenac 75 mg twice daily  Recommend heat/ice as needed  OTC muscle creams/ointments as needed  Restricted duty  Follow-up 3 weeks    Differential diagnosis, natural history, supportive care, and indications for immediate follow-up discussed.    Approximately 25 minutes was spent in preparing for visit, obtaining history, exam and evaluation, patient " counseling/education and post visit documentation/orders.

## 2021-12-04 DIAGNOSIS — S67.42XD CRUSHING INJURY OF LEFT WRIST AND HAND, SUBSEQUENT ENCOUNTER: ICD-10-CM

## 2021-12-06 RX ORDER — DICLOFENAC SODIUM 75 MG/1
TABLET, DELAYED RELEASE ORAL
Qty: 60 TABLET | Refills: 0 | Status: SHIPPED | OUTPATIENT
Start: 2021-12-06 | End: 2022-01-16

## 2021-12-21 ENCOUNTER — OCCUPATIONAL MEDICINE (OUTPATIENT)
Dept: OCCUPATIONAL MEDICINE | Facility: CLINIC | Age: 41
End: 2021-12-21
Payer: COMMERCIAL

## 2021-12-21 VITALS
WEIGHT: 175 LBS | HEIGHT: 64 IN | BODY MASS INDEX: 29.88 KG/M2 | DIASTOLIC BLOOD PRESSURE: 84 MMHG | TEMPERATURE: 97.6 F | OXYGEN SATURATION: 99 % | HEART RATE: 76 BPM | SYSTOLIC BLOOD PRESSURE: 120 MMHG | RESPIRATION RATE: 14 BRPM

## 2021-12-21 DIAGNOSIS — S67.42XD CRUSHING INJURY OF LEFT WRIST AND HAND, SUBSEQUENT ENCOUNTER: ICD-10-CM

## 2021-12-21 PROCEDURE — 99213 OFFICE O/P EST LOW 20 MIN: CPT | Performed by: PREVENTIVE MEDICINE

## 2021-12-21 ASSESSMENT — PAIN SCALES - GENERAL: PAINLEVEL: 7=MODERATE-SEVERE PAIN

## 2021-12-21 NOTE — PROGRESS NOTES
"Subjective:     Brenna Abraham is a 41 y.o. 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 HUSEYIN: a large 40 pound box fell and struck the left hand and index finger. XR of the hand was negative for acute findings.  She states pain in the left wrist is more or less the same as before.  She does state that sometimes the pain is more on the right wrist but oftentimes it is on the ulnar side as well.  She had not received any appointment for hand therapy yet.  She is taking the diclofenac for relief.  She states she is at work doing mostly her normal job but try not to lift heavier things.    ROS         Objective:     /84   Pulse 76   Temp 36.4 °C (97.6 °F) (Temporal)   Resp 14   Ht 1.626 m (5' 4\")   Wt 79.4 kg (175 lb)   SpO2 99%   BMI 30.04 kg/m²     Constitutional: Patient is in no acute distress. Appears well-developed and well-nourished.   Cardiovascular: Normal rate.    Pulmonary/Chest: Effort normal. No respiratory distress.   Neurological: Patient is alert and oriented to person, place, and time.   Skin: Skin is warm and dry.   Psychiatric: Normal mood and affect. Behavior is normal.     Left hand/wrist: No gross deformity.  Tenderness over the ulnar wrist.  Full range of motion but mildly painful in wrist flexion/extension.      Assessment/Plan:       1. Crushing injury of left wrist and hand, subsequent encounter    Released to Restricted Duty FROM 12/21/2021 TO 1/20/2022  Restrictions applied to left hand    Referral to hand therapy, approved pending scheduling  Continue diclofenac 75 mg twice daily  Recommend heat/ice as needed  OTC muscle creams/ointments as needed  Restricted duty  Follow-up 4 weeks, if no significant improvement will consider MRI    Differential diagnosis, natural history, supportive care, and indications for immediate " follow-up discussed.    Approximately 25 minutes was spent in preparing for visit, obtaining history, exam and evaluation, patient counseling/education and post visit documentation/orders.

## 2021-12-21 NOTE — LETTER
[address],   [address] [place] [address]  Phone:  [phone] - Fax:  [phone]   Occupational Health [facility] Progress Report and Disability Certification  Date of Service: 12/21/2021   No Show:  No  Date / Time of Next Visit: 1/20/2022 @ 7:30 am    Claim Information   Patient Name: Brenna Abraham  Claim Number:     Employer:   [employer] Date of Injury: 10/18/2021     Insurer / TPA: [employer]  ID / SSN:     Occupation: Reciver  Diagnosis: The encounter diagnosis was Crushing injury of left wrist and hand, subsequent encounter.    Medical Information   Related to Industrial Injury? 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 HUSEYIN: a large 40 pound box fell and struck the left hand and index finger. XR of the hand was negative for acute findings.  She states pain in the left wrist is more or less the same as before.  She does state that sometimes the pain is more on the right wrist but oftentimes it is on the ulnar side as well.  She had not received any appointment for hand therapy yet.  She is taking the diclofenac for relief.  She states she is at work doing mostly her normal job but try not to lift heavier things.   Objective Findings: Left hand/wrist: No gross deformity.  Tenderness over the ulnar wrist.  Full range of motion but mildly painful in wrist flexion/extension.     Pre-Existing Condition(s):     Assessment:   Condition Same    Status: Additional Care Required  Permanent Disability:No    Plan:      Diagnostics:      Comments:  Referral to hand therapy, approved pending scheduling  Continue diclofenac 75 mg twice daily  Recommend heat/ice as needed  OTC muscle creams/ointments as needed  Restricted duty  Follow-up 4 weeks, if no significant improvement will consider MRI    Disability Information   Status:  Released to Restricted Duty    From:  12/21/2021  Through: 1/20/2022 Restrictions are: Temporary   Physical Restrictions   Sitting:    Standing:    Stooping:    Bending:      Squatting:    Walking:    Climbing:    Pushing:  < or = to 2 hrs/day   Pulling:  < or = to 2 hrs/day Other:    Reaching Above Shoulder (L):   Reaching Above Shoulder (R):       Reaching Below Shoulder (L):    Reaching Below Shoulder (R):      Not to exceed Weight Limits   Carrying(hrs):   Weight Limit(lb): < or = to 10 pounds Lifting(hrs):   Weight  Limit(lb): < or = to 10 pounds   Comments: Restrictions applied to left hand    Repetitive Actions   Hands: i.e. Fine Manipulations from Grasping:     Feet: i.e. Operating Foot Controls:     Driving / Operate Machinery:     Health Care Provider’s Original or Electronic Signature  Joby Goodman D.O. Health Care Provider’s Original or Electronic Signature    Daniel Hall MD         Clinic Name / Location: [address],   [address] [address] Clinic Phone Number: Dept: [phone]   Appointment Time: 7:45 Am Visit Start Time: 7:43 AM   Check-In Time:  7:38 Am Visit Discharge Time:  8:05 am    Original-Treating Physician or Chiropractor    Page 2-Insurer/TPA    Page 3-Employer    Page 4-Employee

## 2022-01-16 ENCOUNTER — OFFICE VISIT (OUTPATIENT)
Dept: URGENT CARE | Facility: PHYSICIAN GROUP | Age: 42
End: 2022-01-16
Payer: COMMERCIAL

## 2022-01-16 ENCOUNTER — HOSPITAL ENCOUNTER (OUTPATIENT)
Facility: MEDICAL CENTER | Age: 42
End: 2022-01-16
Attending: STUDENT IN AN ORGANIZED HEALTH CARE EDUCATION/TRAINING PROGRAM
Payer: COMMERCIAL

## 2022-01-16 VITALS
DIASTOLIC BLOOD PRESSURE: 92 MMHG | SYSTOLIC BLOOD PRESSURE: 130 MMHG | WEIGHT: 174.6 LBS | HEART RATE: 81 BPM | OXYGEN SATURATION: 96 % | HEIGHT: 64 IN | BODY MASS INDEX: 29.81 KG/M2 | RESPIRATION RATE: 17 BRPM | TEMPERATURE: 97.3 F

## 2022-01-16 DIAGNOSIS — R03.0 ELEVATED BLOOD PRESSURE READING: ICD-10-CM

## 2022-01-16 DIAGNOSIS — Z20.822 COVID-19 RULED OUT: ICD-10-CM

## 2022-01-16 DIAGNOSIS — J06.9 VIRAL URI WITH COUGH: ICD-10-CM

## 2022-01-16 PROCEDURE — U0005 INFEC AGEN DETEC AMPLI PROBE: HCPCS

## 2022-01-16 PROCEDURE — 99213 OFFICE O/P EST LOW 20 MIN: CPT | Mod: CS | Performed by: STUDENT IN AN ORGANIZED HEALTH CARE EDUCATION/TRAINING PROGRAM

## 2022-01-16 PROCEDURE — U0003 INFECTIOUS AGENT DETECTION BY NUCLEIC ACID (DNA OR RNA); SEVERE ACUTE RESPIRATORY SYNDROME CORONAVIRUS 2 (SARS-COV-2) (CORONAVIRUS DISEASE [COVID-19]), AMPLIFIED PROBE TECHNIQUE, MAKING USE OF HIGH THROUGHPUT TECHNOLOGIES AS DESCRIBED BY CMS-2020-01-R: HCPCS

## 2022-01-16 RX ORDER — ESCITALOPRAM OXALATE 10 MG/1
10 TABLET ORAL
COMMUNITY
Start: 2021-12-24 | End: 2022-01-23

## 2022-01-16 NOTE — PROGRESS NOTES
Subjective:   CHIEF COMPLAINT  Chief Complaint   Patient presents with   • Sore Throat     diaherria, chills, headache, x5 days       HPI  Brenna Abraham is a 41 y.o. female who presents for COVID testing.  Patient had a rapid test performed which was positive.  The last 4 days the patient has been experiencing, headache, body aches, sore throat, congestion, diarrhea and fever with a T-max of 101.  Diarrhea and fever have resolved.  She has tried taking Tylenol and NyQuil which have helped.  Patient is vaccinated against COVID.  Positive ROS for shortness of breath.  No wheezing.  Patient is accompanied by her friend who is experiencing similar symptoms and is also here for testing today.    REVIEW OF SYSTEMS  General: no fever or chills  GI: no nausea or vomiting  See HPI for further details.     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   • Obesity 10/01/2021   • Poor sleep pattern 10/01/2021   • Breast cancer (HCC) 04/12/2016   • Malignant neoplasm of breast (female) (HCC) 01/19/2016       SURGICAL HISTORY   has a past surgical history that includes primary c section; mastectomy (Bilateral, 1/19/2016); node biopsy sentinel (Right, 1/19/2016); axillary node dissection (Right, 1/19/2016); cath removal (Right, 1/19/2016); tissue expander place/remove (Bilateral, 1/19/2016); irrigation & debridement general (Left, 3/3/2016); tissue expander place/remove (Left, 3/3/2016); pelviscopy (Bilateral, 4/12/2016); and tissue expander place/remove (Right, 5/3/2018).    ALLERGIES  Allergies   Allergen Reactions   • Latex Rash and Itching     Rash and itching at contact site   • Tape      ALLTape and bandaids Cause blisters, NO PAPER TAPE       CURRENT MEDICATIONS  Home Medications     Reviewed by Carli Jasso, Student (Medical Assistant) on 01/16/22 at 1128  Med List Status: <None>   Medication Last Dose  "Status   diclofenac  (VOLTAREN) 75 MG Tablet Delayed Response Not Taking Flagged for Removal                SOCIAL HISTORY  Social History     Tobacco Use   • Smoking status: Former Smoker     Packs/day: 0.25     Years: 0.10     Pack years: 0.02     Types: Cigarettes     Quit date: 2015     Years since quittin.4   • Smokeless tobacco: Never Used   Vaping Use   • Vaping Use: Never used   Substance and Sexual Activity   • Alcohol use: Yes     Comment: 0-1 per week   • Drug use: No   • Sexual activity: Yes     Partners: Male     Birth control/protection: Condom       FAMILY HISTORY  Family History   Problem Relation Age of Onset   • Diabetes Mother    • Hypertension Mother    • Heart Attack Mother    • Alcohol/Drug Father    • Heart Attack Brother    • Cancer Maternal Grandmother         LUNG/SMOKER   • Stroke Neg Hx           Objective:   PHYSICAL EXAM  VITAL SIGNS: /92 (BP Location: Right arm, Patient Position: Sitting, BP Cuff Size: Adult)   Pulse 81   Temp 36.3 °C (97.3 °F) (Temporal)   Resp 17   Ht 1.626 m (5' 4\")   Wt 79.2 kg (174 lb 9.6 oz)   SpO2 96%   BMI 29.97 kg/m²     Gen: no acute distress, normal voice  Skin: dry, intact, moist mucosal membranes  Lungs: CTAB w/ symmetric expansion  CV: RRR w/o murmurs or clicks  Psych: normal affect, normal judgement, alert, awake    Assessment/Plan:     1. Viral URI with cough  COVID/SARS CoV-2 PCR   2. COVID-19 ruled out  COVID/SARS CoV-2 PCR   3. Elevated blood pressure reading     1-2) signs and symptoms are consistent with a viral upper respiratory tract infection.  Patient is vaccinated against COVID.  Rapid test at outside facility was positive.  -Ordered Covid.  Results will be sent through [employer].  -Instructed to quarantine until Covid results have been returned  -Encouraged hydration and symptomatic treatment with Tylenol/ibuprofen prn  -Discussed red flags and return precautions.  Patient verbalized their understanding.  All questions " were answered.    3) blood pressure borderline elevated at 130/92; possibly related to above.  No previous history of hypertension.  She was instructed to follow-up with her PCP for reevaluation and continued management.    Differential diagnosis, natural history, supportive care, and indications for immediate follow-up discussed. Patient agrees with the plan of care.    Follow-up as needed if symptoms worsen or fail to improve to PCP, Urgent care or Emergency Room.       Please note that this dictation was created using voice recognition software. I have made a reasonable attempt to correct obvious errors, but I expect that there are errors of grammar and possibly content that I did not discover before finalizing the note.

## 2022-01-17 DIAGNOSIS — Z20.822 COVID-19 RULED OUT: ICD-10-CM

## 2022-01-17 DIAGNOSIS — J06.9 VIRAL URI WITH COUGH: ICD-10-CM

## 2022-01-17 LAB
COVID ORDER STATUS COVID19: NORMAL
SARS-COV-2 RNA RESP QL NAA+PROBE: DETECTED
SPECIMEN SOURCE: ABNORMAL

## 2022-01-20 ENCOUNTER — OCCUPATIONAL MEDICINE (OUTPATIENT)
Dept: OCCUPATIONAL MEDICINE | Facility: CLINIC | Age: 42
End: 2022-01-20
Payer: COMMERCIAL

## 2022-01-20 VITALS
OXYGEN SATURATION: 98 % | TEMPERATURE: 97.9 F | HEART RATE: 81 BPM | SYSTOLIC BLOOD PRESSURE: 120 MMHG | BODY MASS INDEX: 29.88 KG/M2 | DIASTOLIC BLOOD PRESSURE: 80 MMHG | WEIGHT: 175 LBS | RESPIRATION RATE: 16 BRPM | HEIGHT: 64 IN

## 2022-01-20 DIAGNOSIS — S67.42XD CRUSHING INJURY OF LEFT WRIST AND HAND, SUBSEQUENT ENCOUNTER: ICD-10-CM

## 2022-01-20 PROCEDURE — 99213 OFFICE O/P EST LOW 20 MIN: CPT | Performed by: PREVENTIVE MEDICINE

## 2022-01-20 NOTE — PROGRESS NOTES
"Morning subjective:     Brenna Abraham is a 41 y.o. 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 HUSEYIN: a large 40 pound box fell and struck the left hand and index finger. XR of the hand was negative for acute findings.  Patient states overall symptoms are about the same.  She does continue to be able to work light duty.  Pain mostly on the wrist more so on the ulnar side.  Pain with frequent use.  She received a call from [facility] about scheduling hand therapy but is at a time when she worked they said they would call her back but have yet to do so.  She states that several weeks ago.    ROS         Objective:     /80   Pulse 81   Temp 36.6 °C (97.9 °F) (Temporal)   Resp 16   Ht 1.626 m (5' 4\")   Wt 79.4 kg (175 lb)   SpO2 98%   BMI 30.04 kg/m²     Constitutional: Patient is in no acute distress. Appears well-developed and well-nourished.   Cardiovascular: Normal rate.    Pulmonary/Chest: Effort normal. No respiratory distress.   Neurological: Patient is alert and oriented to person, place, and time.   Skin: Skin is warm and dry.   Psychiatric: Normal mood and affect. Behavior is normal.     Left hand/wrist: No gross deformity.  Mild tenderness over the ulnar wrist and hyperthenar eminence.  Full range of motion but mildly painful in wrist flexion/extension.     Assessment/Plan:       1. Crushing injury of left wrist and hand, subsequent encounter    Released to Restricted Duty FROM 1/20/2022 TO 2/22/2022  Restrictions applied to left hand     Referral for hand therapy approved, patient in the process of scheduling, advised to call [facility] every day until scheduled  OTC ibuprofen/Tylenol as needed  OTC muscle creams/ointments as needed  Heat/ice as needed  Restricted duty  Follow-up 4 " dirk anaya    Differential diagnosis, natural history, supportive care, and indications for immediate follow-up discussed.    Approximately 25 minutes was spent in preparing for visit, obtaining history, exam and evaluation, patient counseling/education and post visit documentation/orders.

## 2022-01-20 NOTE — LETTER
[address],   [address] [address]  Phone:  [phone] - Fax:  [phone]   Occupational Health [facility] Progress Report and Disability Certification  Date of Service: 1/20/2022   No Show:  No  Date / Time of Next Visit: 2/22/2022 @ 7:30am   Claim Information   Patient Name: Brenna Abraham  Claim Number:     Employer:   [employer] Date of Injury: 10/18/2021     Insurer / TPA: [employer]  ID / SSN:     Occupation: Reciver  Diagnosis: The encounter diagnosis was Crushing injury of left wrist and hand, subsequent encounter.    Medical Information   Related to Industrial Injury? 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 HUSEYIN: a large 40 pound box fell and struck the left hand and index finger. XR of the hand was negative for acute findings.  Patient states overall symptoms are about the same.  She does continue to be able to work light duty.  Pain mostly on the wrist more so on the ulnar side.  Pain with frequent use.  She received a call from [facility] about scheduling hand therapy but is at a time when she worked they said they would call her back but have yet to do so.  She states that several weeks ago.   Objective Findings: Left hand/wrist: No gross deformity.  Mild tenderness over the ulnar wrist and hyperthenar eminence.  Full range of motion but mildly painful in wrist flexion/extension.    Pre-Existing Condition(s):     Assessment:   Condition Same    Status: Additional Care Required  Permanent Disability:No    Plan:      Diagnostics:      Comments:  Referral for hand therapy approved, patient in the process of scheduling, advised to call [facility] every day until scheduled  OTC ibuprofen/Tylenol as needed  OTC muscle creams/ointments as needed  Heat/ice as needed  Restricted duty  Follow-up 4 dirk anaya     Disability Information   Status: Released to Restricted Duty    From:  1/20/2022  Through: 2/22/2022 Restrictions are: Temporary   Physical Restrictions   Sitting:    Standing:    Stooping:    Bending:      Squatting:    Walking:    Climbing:    Pushing:  < or = to 2 hrs/day   Pulling:  < or = to 2 hrs/day Other:    Reaching Above Shoulder (L):   Reaching Above Shoulder (R):       Reaching Below Shoulder (L):    Reaching Below Shoulder (R):      Not to exceed Weight Limits   Carrying(hrs):   Weight Limit(lb): < or = to 10 pounds Lifting(hrs):   Weight  Limit(lb): < or = to 10 pounds   Comments: Restrictions applied to left hand     Repetitive Actions   Hands: i.e. Fine Manipulations from Grasping:     Feet: i.e. Operating Foot Controls:     Driving / Operate Machinery:     Health Care Provider’s Original or Electronic Signature  Joby Goodman D.O. Health Care Provider’s Original or Electronic Signature    Daniel Hall MD         Clinic Name / Location: [address] [address] Clinic Phone Number: Dept: [phone]   Appointment Time: 7:30 Am Visit Start Time: 7:36 AM   Check-In Time:  7:32 Am Visit Discharge Time:  7:55 AM    Original-Treating Physician or Chiropractor    Page 2-Insurer/TPA    Page 3-Employer    Page 4-Employee

## 2022-02-22 ENCOUNTER — OCCUPATIONAL MEDICINE (OUTPATIENT)
Dept: OCCUPATIONAL MEDICINE | Facility: CLINIC | Age: 42
End: 2022-02-22
Payer: COMMERCIAL

## 2022-02-22 VITALS
RESPIRATION RATE: 18 BRPM | SYSTOLIC BLOOD PRESSURE: 132 MMHG | BODY MASS INDEX: 30.39 KG/M2 | DIASTOLIC BLOOD PRESSURE: 80 MMHG | HEIGHT: 64 IN | WEIGHT: 178 LBS

## 2022-02-22 DIAGNOSIS — S67.42XD CRUSHING INJURY OF LEFT WRIST AND HAND, SUBSEQUENT ENCOUNTER: ICD-10-CM

## 2022-02-22 PROCEDURE — 99212 OFFICE O/P EST SF 10 MIN: CPT | Performed by: PREVENTIVE MEDICINE

## 2022-02-22 NOTE — PROGRESS NOTES
"Subjective:     Brenna Abraham is a 41 y.o. 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 HUSEYIN: a large 40 pound box fell and struck the left hand and index finger. XR of the hand was negative for acute findings.  Patient states that overall she had a little bit of improvement in symptoms.  She states she has little bit less pain from before but then she started doing more at work and is now little more sore but feels it is just sore because she has not been doing that much work for a while.  She did start hand therapy and has attended 1 session thus far.    ROS         Objective:     /80   Resp 18   Ht 1.626 m (5' 4\")   Wt 80.7 kg (178 lb)   BMI 30.55 kg/m²     Constitutional: Patient is in no acute distress. Appears well-developed and well-nourished.   Cardiovascular: Normal rate.    Pulmonary/Chest: Effort normal. No respiratory distress.   Neurological: Patient is alert and oriented to person, place, and time.   Skin: Skin is warm and dry.   Psychiatric: Normal mood and affect. Behavior is normal.     Left hand/wrist: No gross deformity.  Area of pain over the ulnar wrist mostly.  Full range of motion but mildly painful in wrist flexion/extension.     Assessment/Plan:       1. Crushing injury of left wrist and hand, subsequent encounter    Released to Restricted Duty FROM 2/22/2022 TO 3/18/2022  Restrictions applied to left hand      Continue hand therapy  OTC ibuprofen/Tylenol as needed  OTC muscle creams/ointments as needed  Heat/ice as needed  Restricted duty  Follow-up 4 weeks     Differential diagnosis, natural history, supportive care, and indications for immediate follow-up discussed.    Approximately 25 minutes was spent in preparing for visit, obtaining history, exam and evaluation, patient counseling/education and post " visit documentation/orders.

## 2022-02-22 NOTE — LETTER
[address],   [address] [place] [address]  Phone:  [phone] - Fax:  [phone]   Occupational Health [facility] Progress Report and Disability Certification  Date of Service: 2/22/2022   No Show:  No  Date / Time of Next Visit: 3/18/2022 @7:30 AM    Claim Information   Patient Name: Brenna Abraham  Claim Number:     Employer:   [employer] Date of Injury: 10/18/2021     Insurer / TPA: [employer]  ID / SSN:     Occupation: Reciver  Diagnosis: The encounter diagnosis was Crushing injury of left wrist and hand, subsequent encounter.    Medical Information   Related to Industrial Injury? 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 HUSEYIN: a large 40 pound box fell and struck the left hand and index finger. XR of the hand was negative for acute findings.  Patient states that overall she had a little bit of improvement in symptoms.  She states she has little bit less pain from before but then she started doing more at work and is now little more sore but feels it is just sore because she has not been doing that much work for a while.  She did start hand therapy and has attended 1 session thus far.   Objective Findings: Left hand/wrist: No gross deformity.  Area of pain over the ulnar wrist mostly.  Full range of motion but mildly painful in wrist flexion/extension.    Pre-Existing Condition(s):     Assessment:   Condition Same    Status: Additional Care Required  Permanent Disability:No    Plan:      Diagnostics:      Comments:  Continue hand therapy  OTC ibuprofen/Tylenol as needed  OTC muscle creams/ointments as needed  Heat/ice as needed  Restricted duty  Follow-up 4 weeks     Disability Information   Status: Released to Restricted Duty    From:  2/22/2022  Through: 3/18/2022 Restrictions are: Temporary   Physical Restrictions    Sitting:    Standing:    Stooping:    Bending:      Squatting:    Walking:    Climbing:    Pushing:  < or = to 2 hrs/day   Pulling:  < or = to 2 hrs/day Other:    Reaching Above Shoulder (L):   Reaching Above Shoulder (R):       Reaching Below Shoulder (L):    Reaching Below Shoulder (R):      Not to exceed Weight Limits   Carrying(hrs):   Weight Limit(lb): < or = to 10 pounds Lifting(hrs):   Weight  Limit(lb): < or = to 10 pounds   Comments: Restrictions applied to left hand      Repetitive Actions   Hands: i.e. Fine Manipulations from Grasping:     Feet: i.e. Operating Foot Controls:     Driving / Operate Machinery:     Health Care Provider’s Original or Electronic Signature  Joby Goodman D.O. Health Care Provider’s Original or Electronic Signature    Daniel Hall MD         Clinic Name / Location: [address],   Suite [facility]  [place] [address] Clinic Phone Number: Dept: [phone]   Appointment Time: 7:30 Am Visit Start Time: 7:44 AM   Check-In Time:  7:32 Am Visit Discharge Time:  8 am    Original-Treating Physician or Chiropractor    Page 2-Insurer/TPA    Page 3-Employer    Page 4-Employee

## 2022-03-18 ENCOUNTER — OCCUPATIONAL MEDICINE (OUTPATIENT)
Dept: OCCUPATIONAL MEDICINE | Facility: CLINIC | Age: 42
End: 2022-03-18
Payer: COMMERCIAL

## 2022-03-18 VITALS
TEMPERATURE: 96.1 F | DIASTOLIC BLOOD PRESSURE: 84 MMHG | WEIGHT: 178 LBS | HEART RATE: 73 BPM | HEIGHT: 64 IN | OXYGEN SATURATION: 97 % | SYSTOLIC BLOOD PRESSURE: 114 MMHG | BODY MASS INDEX: 30.39 KG/M2

## 2022-03-18 DIAGNOSIS — S67.42XD CRUSHING INJURY OF LEFT WRIST AND HAND, SUBSEQUENT ENCOUNTER: ICD-10-CM

## 2022-03-18 PROCEDURE — 99212 OFFICE O/P EST SF 10 MIN: CPT | Performed by: PREVENTIVE MEDICINE

## 2022-03-18 NOTE — PROGRESS NOTES
"Subjective:     Brenna Abraham is a 41 y.o. 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 HUSEYIN: a large 40 pound box fell and struck the left hand and index finger. XR of the hand was negative for acute findings.  Patient states overall she feels much improved.  She states the only has a little bit of soreness at the end of the day.  She states that she has been trying to do more with her hand than before.  She states she attended 4 sessions of hand therapy and then they suddenly got canceled she is unsure why but at this point feels like she is ready to try and go full duty.    ROS       Objective:     /84   Pulse 73   Temp (!) 35.6 °C (96.1 °F)   Ht 1.626 m (5' 4\")   Wt 80.7 kg (178 lb)   SpO2 97%   BMI 30.55 kg/m²     Constitutional: Patient is in no acute distress. Appears well-developed and well-nourished.   Cardiovascular: Normal rate.    Pulmonary/Chest: Effort normal. No respiratory distress.   Neurological: Patient is alert and oriented to person, place, and time.   Skin: Skin is warm and dry.   Psychiatric: Normal mood and affect. Behavior is normal.     Left hand/wrist: No gross deformity.  No tenderness to palpation.  Full range of motion.    Assessment/Plan:       1. Crushing injury of left wrist and hand, subsequent encounter    Released to Full Duty FROM 3/18/2022 TO       Released from care  Full duty  Expect gradual resolution of symptoms over the next month or so.  If symptoms worsen return to clinic    Differential diagnosis, natural history, supportive care, and indications for immediate follow-up discussed.    Approximately 15 minutes was spent in preparing for visit, obtaining history, exam and evaluation, patient counseling/education and post visit documentation/orders.  "

## 2022-03-18 NOTE — LETTER
[address],   [address] [place] [address]  Phone:  [phone] - Fax:  [phone]   Occupational Health [facility] Progress Report and Disability Certification  Date of Service: 3/18/2022   No Show:  No  Date / Time of Next Visit:  Release from care   Claim Information   Patient Name: Brenna Abraham  Claim Number:     Employer:    [employer] Date of Injury: 10/18/2021     Insurer / TPA: [employer]  ID / SSN:     Occupation: Reciver  Diagnosis: The encounter diagnosis was Crushing injury of left wrist and hand, subsequent encounter.    Medical Information   Related to Industrial Injury? 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 HUSEYIN: a large 40 pound box fell and struck the left hand and index finger. XR of the hand was negative for acute findings.  Patient states overall she feels much improved.  She states the only has a little bit of soreness at the end of the day.  She states that she has been trying to do more with her hand than before.  She states she attended 4 sessions of hand therapy and then they suddenly got canceled she is unsure why but at this point feels like she is ready to try and go full duty.   Objective Findings: Left hand/wrist: No gross deformity.  No tenderness to palpation.  Full range of motion.   Pre-Existing Condition(s):     Assessment:   Condition Improved    Status: Discharged /  MMI  Permanent Disability:No    Plan:      Diagnostics:      Comments:  Released from care  Full duty  Expect gradual resolution of symptoms over the next month or so.  If symptoms worsen return to clinic    Disability Information   Status: Released to Full Duty    From:  3/18/2022  Through:   Restrictions are:     Physical Restrictions   Sitting:    Standing:    Stooping:    Bending:      Squatting:    Walking:     Climbing:    Pushing:      Pulling:    Other:    Reaching Above Shoulder (L):   Reaching Above Shoulder (R):       Reaching Below Shoulder (L):    Reaching Below Shoulder (R):      Not to exceed Weight Limits   Carrying(hrs):   Weight Limit(lb):   Lifting(hrs):   Weight  Limit(lb):     Comments:      Repetitive Actions   Hands: i.e. Fine Manipulations from Grasping:     Feet: i.e. Operating Foot Controls:     Driving / Operate Machinery:     Health Care Provider’s Original or Electronic Signature  Joby Goodman D.O. Health Care Provider’s Original or Electronic Signature    Daniel Hall MD         Clinic Name / Location: [address],   [address] [place] [address] Clinic Phone Number: Dept: [phone]   Appointment Time: 7:30 Am Visit Start Time: 7:36 AM   Check-In Time:  7:31 Am Visit Discharge Time:  7:53 am    Original-Treating Physician or Chiropractor    Page 2-Insurer/TPA    Page 3-Employer    Page 4-Employee

## 2022-09-07 ENCOUNTER — APPOINTMENT (OUTPATIENT)
Dept: RADIOLOGY | Facility: MEDICAL CENTER | Age: 42
End: 2022-09-07
Attending: EMERGENCY MEDICINE
Payer: COMMERCIAL

## 2022-09-07 ENCOUNTER — HOSPITAL ENCOUNTER (EMERGENCY)
Facility: MEDICAL CENTER | Age: 42
End: 2022-09-07
Attending: EMERGENCY MEDICINE
Payer: COMMERCIAL

## 2022-09-07 VITALS
DIASTOLIC BLOOD PRESSURE: 92 MMHG | HEIGHT: 64 IN | RESPIRATION RATE: 16 BRPM | WEIGHT: 178.57 LBS | BODY MASS INDEX: 30.49 KG/M2 | OXYGEN SATURATION: 97 % | SYSTOLIC BLOOD PRESSURE: 140 MMHG | TEMPERATURE: 97.6 F | HEART RATE: 60 BPM

## 2022-09-07 DIAGNOSIS — R20.2 PARESTHESIAS: ICD-10-CM

## 2022-09-07 LAB
ALBUMIN SERPL BCP-MCNC: 4.3 G/DL (ref 3.2–4.9)
ALBUMIN/GLOB SERPL: 1.4 G/DL
ALP SERPL-CCNC: 74 U/L (ref 30–99)
ALT SERPL-CCNC: 20 U/L (ref 2–50)
ANION GAP SERPL CALC-SCNC: 10 MMOL/L (ref 7–16)
AST SERPL-CCNC: 16 U/L (ref 12–45)
BASOPHILS # BLD AUTO: 0.5 % (ref 0–1.8)
BASOPHILS # BLD: 0.03 K/UL (ref 0–0.12)
BILIRUB SERPL-MCNC: 0.3 MG/DL (ref 0.1–1.5)
BUN SERPL-MCNC: 8 MG/DL (ref 8–22)
CALCIUM SERPL-MCNC: 8.9 MG/DL (ref 8.4–10.2)
CHLORIDE SERPL-SCNC: 106 MMOL/L (ref 96–112)
CO2 SERPL-SCNC: 24 MMOL/L (ref 20–33)
CREAT SERPL-MCNC: 0.56 MG/DL (ref 0.5–1.4)
EOSINOPHIL # BLD AUTO: 0.05 K/UL (ref 0–0.51)
EOSINOPHIL NFR BLD: 0.8 % (ref 0–6.9)
ERYTHROCYTE [DISTWIDTH] IN BLOOD BY AUTOMATED COUNT: 37 FL (ref 35.9–50)
GFR SERPLBLD CREATININE-BSD FMLA CKD-EPI: 118 ML/MIN/1.73 M 2
GLOBULIN SER CALC-MCNC: 3.1 G/DL (ref 1.9–3.5)
GLUCOSE SERPL-MCNC: 115 MG/DL (ref 65–99)
HCG SERPL QL: NEGATIVE
HCT VFR BLD AUTO: 38.7 % (ref 37–47)
HGB BLD-MCNC: 13.3 G/DL (ref 12–16)
IMM GRANULOCYTES # BLD AUTO: 0.03 K/UL (ref 0–0.11)
IMM GRANULOCYTES NFR BLD AUTO: 0.5 % (ref 0–0.9)
LYMPHOCYTES # BLD AUTO: 1.94 K/UL (ref 1–4.8)
LYMPHOCYTES NFR BLD: 29.1 % (ref 22–41)
MCH RBC QN AUTO: 29.4 PG (ref 27–33)
MCHC RBC AUTO-ENTMCNC: 34.4 G/DL (ref 33.6–35)
MCV RBC AUTO: 85.6 FL (ref 81.4–97.8)
MONOCYTES # BLD AUTO: 0.44 K/UL (ref 0–0.85)
MONOCYTES NFR BLD AUTO: 6.6 % (ref 0–13.4)
NEUTROPHILS # BLD AUTO: 4.17 K/UL (ref 2–7.15)
NEUTROPHILS NFR BLD: 62.5 % (ref 44–72)
NRBC # BLD AUTO: 0 K/UL
NRBC BLD-RTO: 0 /100 WBC
PLATELET # BLD AUTO: 359 K/UL (ref 164–446)
PMV BLD AUTO: 8.6 FL (ref 9–12.9)
POTASSIUM SERPL-SCNC: 3.9 MMOL/L (ref 3.6–5.5)
PROT SERPL-MCNC: 7.4 G/DL (ref 6–8.2)
RBC # BLD AUTO: 4.52 M/UL (ref 4.2–5.4)
SODIUM SERPL-SCNC: 140 MMOL/L (ref 135–145)
TSH SERPL DL<=0.005 MIU/L-ACNC: 0.96 UIU/ML (ref 0.38–5.33)
WBC # BLD AUTO: 6.7 K/UL (ref 4.8–10.8)

## 2022-09-07 PROCEDURE — 99283 EMERGENCY DEPT VISIT LOW MDM: CPT

## 2022-09-07 PROCEDURE — 70551 MRI BRAIN STEM W/O DYE: CPT

## 2022-09-07 PROCEDURE — 80053 COMPREHEN METABOLIC PANEL: CPT

## 2022-09-07 PROCEDURE — 84703 CHORIONIC GONADOTROPIN ASSAY: CPT

## 2022-09-07 PROCEDURE — 84443 ASSAY THYROID STIM HORMONE: CPT

## 2022-09-07 PROCEDURE — 85025 COMPLETE CBC W/AUTO DIFF WBC: CPT

## 2022-09-07 PROCEDURE — 36415 COLL VENOUS BLD VENIPUNCTURE: CPT

## 2022-09-07 RX ORDER — ACETAMINOPHEN 500 MG
1000 TABLET ORAL EVERY 6 HOURS PRN
COMMUNITY

## 2022-09-07 RX ORDER — CHLORAL HYDRATE 500 MG
1000 CAPSULE ORAL DAILY
COMMUNITY

## 2022-09-07 ASSESSMENT — FIBROSIS 4 INDEX: FIB4 SCORE: 0.47

## 2022-09-07 NOTE — ED NOTES
Med rec updated and complete, per pt  Allergies reviewed, per pt   Pt reports no prescription medications.  Pt reports no antibiotics in the last 30 days.

## 2022-09-07 NOTE — ED PROVIDER NOTES
"ED Provider Note    CHIEF COMPLAINT  Chief Complaint   Patient presents with    Numbness     Right facial numbness for 3 weeks isolated to her right face. No signs of deficit. She also states that several times a day she says something that means another. \"Put the tortillas on the table\", instead of \"put the napkins on the table\".       HPI  Brenna Jasso is a 42 y.o. female who presents for evaluation of reported 3 weeks of isolated paresthesias to the upper and lower aspect of her right face with headache.  She has no significant medical history.  She was seen at an primary care/urgent care and referred here for higher level of care.  She specifically denies any head trauma.  No visual changes such as double vision or scotoma.  No motor deficits or complaints such as focal weakness to the arms legs or face.  She denies any back pain neck pain fevers chills or rash.    REVIEW OF SYSTEMS  See HPI for further details.  Positive for headache right-sided facial paresthesias no reported numbness all other systems are negative.     PAST MEDICAL HISTORY  Past Medical History:   Diagnosis Date    Cancer (HCC) 2016    right breast       FAMILY HISTORY  Noncontributory  SOCIAL HISTORY  Social History     Socioeconomic History    Marital status:    Occupational History    Occupation: FOOD RUNNER     Employer: [employer]   Tobacco Use    Smoking status: Former     Packs/day: 0.25     Years: 0.10     Pack years: 0.03     Types: Cigarettes     Quit date: 2015     Years since quittin.0    Smokeless tobacco: Never   Vaping Use    Vaping Use: Never used   Substance and Sexual Activity    Alcohol use: Yes     Comment: 0-1 per week    Drug use: No    Sexual activity: Yes     Partners: Male     Birth control/protection: Condom       SURGICAL HISTORY  Past Surgical History:   Procedure Laterality Date    TISSUE EXPANDER PLACE/REMOVE Right 5/3/2018    Procedure: TISSUE EXPANDER PLACE/REMOVE - " REMOVAL;  Surgeon: Jose L Huff M.D.;  Location: SURGERY SAME DAY [facility];  Service: Plastics    PELVISCOPY Bilateral 4/12/2016    Procedure: PELVISCOPY, BSO, LYSIS OF ADHESIONS;  Surgeon: Ranjit Gr M.D.;  Location: SURGERY SAME DAY [facility];  Service:     IRRIGATION & DEBRIDEMENT GENERAL Left 3/3/2016    Procedure: IRRIGATION & DEBRIDEMENT GENERAL OF MASTECTOMY SKIN FLAP;  Surgeon: Jose L Huff M.D.;  Location: SURGERY SAME DAY [facility];  Service:     TISSUE EXPANDER PLACE/REMOVE Left 3/3/2016    Procedure: TISSUE EXPANDER PLACE/REMOVE FOR REMOVAL AND CLOSURE WITH LOCAL FLAPS;  Surgeon: Jose L Huff M.D.;  Location: SURGERY SAME DAY [facility];  Service:     MASTECTOMY Bilateral 1/19/2016    Procedure: MASTECTOMY;  Surgeon: Tamika Cee M.D.;  Location: SURGERY SAME DAY [facility];  Service:     NODE BIOPSY SENTINEL Right 1/19/2016    Procedure: NODE BIOPSY SENTINEL;  Surgeon: Tamika Cee M.D.;  Location: SURGERY SAME DAY [facility];  Service:     AXILLARY NODE DISSECTION Right 1/19/2016    Procedure: AXILLARY NODE DISSECTION possible conversion;  Surgeon: Tamika Cee M.D.;  Location: SURGERY SAME DAY [facility];  Service:     CATH REMOVAL Right 1/19/2016    Procedure: CATH REMOVAL port;  Surgeon: Tamika Cee M.D.;  Location: SURGERY SAME DAY [facility];  Service:     TISSUE EXPANDER PLACE/REMOVE Bilateral 1/19/2016    Procedure: TISSUE EXPANDER PLACE/REMOVE using acellular dermal matrix;  Surgeon: Jose L Huff M.D.;  Location: SURGERY SAME DAY [facility];  Service:     PRIMARY C SECTION      FOUR       CURRENT MEDICATIONS  Home Medications       Reviewed by Lazaro Valenzuela (Pharmacy Tech) on 09/07/22 at 1150  Med List Status: Complete     Medication Last Dose Status   acetaminophen (TYLENOL) 500 MG Tab 9/7/2022 Active   multivitamin (THERAGRAN) Tab 9/6/2022 Active   Omega-3 Fatty Acids (FISH OIL) 1000 MG Cap capsule  "9/6/2022 Active                    ALLERGIES  Allergies   Allergen Reactions    Latex Rash and Itching     Rash and itching at contact site    Tape      ALLTape and bandaids Cause blisters, NO PAPER TAPE       PHYSICAL EXAM  VITAL SIGNS: BP (!) 140/92   Pulse 60   Temp 36.2 °C (97.2 °F) (Temporal)   Resp 16   Ht 1.626 m (5' 4\")   Wt 81 kg (178 lb 9.2 oz)   SpO2 97%   BMI 30.65 kg/m²       Constitutional: Well developed, Well nourished, No acute distress, Non-toxic appearance.   HENT: Normocephalic, Atraumatic, Bilateral external ears normal, Oropharynx moist, No oral exudates, Nose normal.   Eyes: PERRLA, EOMI, Conjunctiva normal, No discharge.   Neck: Normal range of motion, No tenderness, Supple, No stridor.   Lymphatic: No lymphadenopathy noted.   Cardiovascular: Normal heart rate, Normal rhythm, No murmurs, No rubs, No gallops.   Thorax & Lungs: Normal breath sounds, No respiratory distress, No wheezing, No chest tenderness.   Abdomen: Bowel sounds normal, Soft, No tenderness, No masses, No pulsatile masses.   Skin: Warm, Dry, No erythema, No rash.   Extremities: Intact distal pulses, No edema, No tenderness, No cyanosis, No clubbing.   Musculoskeletal: Good range of motion in all major joints. No tenderness to palpation or major deformities noted.   Neurologic: Alert & oriented x 3, Normal motor function, patient with subjective paresthesias to the right upper and lower face without facial droop.  Cranial nerves II through XII are otherwise grossly normal no pronator drift.  Speech patterns normal no ataxia NIH stroke scale score of 1  psychiatric: Affect normal, Judgment normal, Mood normal.     MR-BRAIN-W/O   Final Result         Brain MRI within normal limits.        Results for orders placed or performed during the hospital encounter of 09/07/22   TSH WITH REFLEX TO FT4   Result Value Ref Range    TSH 0.959 0.380 - 5.330 uIU/mL   CBC WITH DIFFERENTIAL   Result Value Ref Range    WBC 6.7 4.8 - 10.8 " K/uL    RBC 4.52 4.20 - 5.40 M/uL    Hemoglobin 13.3 12.0 - 16.0 g/dL    Hematocrit 38.7 37.0 - 47.0 %    MCV 85.6 81.4 - 97.8 fL    MCH 29.4 27.0 - 33.0 pg    MCHC 34.4 33.6 - 35.0 g/dL    RDW 37.0 35.9 - 50.0 fL    Platelet Count 359 164 - 446 K/uL    MPV 8.6 (L) 9.0 - 12.9 fL    Neutrophils-Polys 62.50 44.00 - 72.00 %    Lymphocytes 29.10 22.00 - 41.00 %    Monocytes 6.60 0.00 - 13.40 %    Eosinophils 0.80 0.00 - 6.90 %    Basophils 0.50 0.00 - 1.80 %    Immature Granulocytes 0.50 0.00 - 0.90 %    Nucleated RBC 0.00 /100 WBC    Neutrophils (Absolute) 4.17 2.00 - 7.15 K/uL    Lymphs (Absolute) 1.94 1.00 - 4.80 K/uL    Monos (Absolute) 0.44 0.00 - 0.85 K/uL    Eos (Absolute) 0.05 0.00 - 0.51 K/uL    Baso (Absolute) 0.03 0.00 - 0.12 K/uL    Immature Granulocytes (abs) 0.03 0.00 - 0.11 K/uL    NRBC (Absolute) 0.00 K/uL   Comp Metabolic Panel   Result Value Ref Range    Sodium 140 135 - 145 mmol/L    Potassium 3.9 3.6 - 5.5 mmol/L    Chloride 106 96 - 112 mmol/L    Co2 24 20 - 33 mmol/L    Anion Gap 10.0 7.0 - 16.0    Glucose 115 (H) 65 - 99 mg/dL    Bun 8 8 - 22 mg/dL    Creatinine 0.56 0.50 - 1.40 mg/dL    Calcium 8.9 8.4 - 10.2 mg/dL    AST(SGOT) 16 12 - 45 U/L    ALT(SGPT) 20 2 - 50 U/L    Alkaline Phosphatase 74 30 - 99 U/L    Total Bilirubin 0.3 0.1 - 1.5 mg/dL    Albumin 4.3 3.2 - 4.9 g/dL    Total Protein 7.4 6.0 - 8.2 g/dL    Globulin 3.1 1.9 - 3.5 g/dL    A-G Ratio 1.4 g/dL   HCG QUAL SERUM   Result Value Ref Range    Beta-Hcg Qualitative Serum Negative Negative   ESTIMATED GFR   Result Value Ref Range    GFR (CKD-EPI) 118 >60 mL/min/1.73 m 2         COURSE & MEDICAL DECISION MAKING  Pertinent Labs & Imaging studies reviewed. (See chart for details)  Patient presents here with nearly 3 weeks of symptoms including mild headache and right-sided facial paresthesias with no motor complaints or any other cranial nerve abnormalities.  Clinically this could be a migraine or migraine variant.  With her  symptomatology I felt that going straight to an MRI would be more clinically appropriate.  This was performed and did not demonstrate any evidence of acute or subacute stroke brain mass or mass-effect.  Laboratory studies are reassuring and normal.  She clinically feels well.  I will refer her back to her PCP for ongoing management    FINAL IMPRESSION  1.  Cephalalgia  2.  Right-sided facial paresthesias         Electronically signed by: Hebert Rosenberg M.D., 9/7/2022 10:18 AM

## 2022-09-07 NOTE — ED TRIAGE NOTES
"Chief Complaint   Patient presents with    Numbness     Right facial numbness for 3 weeks isolated to her right face. No signs of deficit. She also states that several times a day she says something that means another. \"Put the tortillas on the table\", instead of \"put the napkins on the table\".   Reports she does get headaches and does not think she is stressed  "

## 2022-10-31 ENCOUNTER — HOSPITAL ENCOUNTER (OUTPATIENT)
Facility: MEDICAL CENTER | Age: 42
End: 2022-10-31
Attending: OBSTETRICS & GYNECOLOGY
Payer: COMMERCIAL

## 2022-10-31 ENCOUNTER — HOSPITAL ENCOUNTER (OUTPATIENT)
Dept: LAB | Facility: MEDICAL CENTER | Age: 42
End: 2022-10-31
Attending: OBSTETRICS & GYNECOLOGY

## 2022-10-31 PROCEDURE — 88175 CYTOPATH C/V AUTO FLUID REDO: CPT

## 2022-11-01 LAB — CYTOLOGY REG CYTOL: NORMAL

## 2023-10-31 ENCOUNTER — HOSPITAL ENCOUNTER (OUTPATIENT)
Dept: LAB | Facility: MEDICAL CENTER | Age: 43
End: 2023-10-31
Attending: OBSTETRICS & GYNECOLOGY
Payer: COMMERCIAL

## 2023-10-31 PROCEDURE — 87624 HPV HI-RISK TYP POOLED RSLT: CPT

## 2023-10-31 PROCEDURE — 88175 CYTOPATH C/V AUTO FLUID REDO: CPT

## 2023-11-03 LAB
CYTOLOGIST CVX/VAG CYTO: NORMAL
CYTOLOGY CVX/VAG DOC CYTO: NORMAL
CYTOLOGY CVX/VAG DOC THIN PREP: NORMAL
HPV I/H RISK 4 DNA CVX QL PROBE+SIG AMP: NEGATIVE
NOTE NL11727A: NORMAL
OTHER STN SPEC: NORMAL
STAT OF ADQ CVX/VAG CYTO-IMP: NORMAL

## (undated) DEVICE — SPANDAGE CHEST SZ10 25YDS - STRETCH (21 EA/CA 1RL/CA)

## (undated) DEVICE — SET MULTI-ADD FLUID TRANSFER 42 INCH - (50/CA)THIS IS A CUSTOM MADE ITEM 4 TO 6 WEEK LEAD TIME

## (undated) DEVICE — PACK MINOR BASIN - (2EA/CA)

## (undated) DEVICE — KIT ANESTHESIA W/CIRCUIT & 3/LT BAG W/FILTER (20EA/CA)

## (undated) DEVICE — PROTECTOR ULNA NERVE - (36PR/CA)

## (undated) DEVICE — LACTATED RINGERS INJ 1000 ML - (14EA/CA 60CA/PF)

## (undated) DEVICE — MASK ANESTHESIA ADULT  - (100/CA)

## (undated) DEVICE — SENSOR SPO2 NEO LNCS ADHESIVE (20/BX) SEE USER NOTES

## (undated) DEVICE — GLOVE SZ 6.5 BIOGEL PI MICRO - PF LF (50PR/BX)

## (undated) DEVICE — CHLORAPREP 26 ML APPLICATOR - ORANGE TINT(25/CA)

## (undated) DEVICE — SODIUM CHL IRRIGATION 0.9% 1000ML (12EA/CA)

## (undated) DEVICE — CLOSURE SKIN STRIP 1/2 X 4 IN - (STERI STRIP) (50/BX 4BX/CA)

## (undated) DEVICE — SOD. CHL. INJ. 0.9% 1000 ML - (14EA/CA 60CA/PF)

## (undated) DEVICE — NEPTUNE 4 PORT MANIFOLD - (20/PK)

## (undated) DEVICE — CANISTER SUCTION 3000ML MECHANICAL FILTER AUTO SHUTOFF MEDI-VAC NONSTERILE LF DISP  (40EA/CA)

## (undated) DEVICE — MASK, LARYNGEAL AIRWAY #4

## (undated) DEVICE — STAPLER SKIN DISP - (6/BX 10BX/CA) VISISTAT

## (undated) DEVICE — SUTURE 4-0 MONOCRYL PLUS PS-2 - 27 INCH (36/BX)

## (undated) DEVICE — SUCTION INSTRUMENT YANKAUER BULBOUS TIP W/O VENT (50EA/CA)

## (undated) DEVICE — GLOVE BIOGEL SZ 8 SURGICAL PF LTX - (50PR/BX 4BX/CA)

## (undated) DEVICE — GOWN WARMING STANDARD FLEX - (30/CA)

## (undated) DEVICE — ELECTRODE DUAL RETURN W/ CORD - (50/PK)

## (undated) DEVICE — CANISTER SUCTION RIGID RED 1500CC (40EA/CA)

## (undated) DEVICE — SUTURE 3-0 MONOCRYL PLUS PS-2 - (12/BX)

## (undated) DEVICE — CATHETER IV 20 GA X 1-1/4 ---SURG.& SDS ONLY--- (50EA/BX)

## (undated) DEVICE — TUBING CLEARLINK DUO-VENT - C-FLO (48EA/CA)

## (undated) DEVICE — TUBE CONNECTING SUCTION - CLEAR PLASTIC STERILE 72 IN (50EA/CA)

## (undated) DEVICE — KIT  I.V. START (100EA/CA)

## (undated) DEVICE — HEAD HOLDER JUNIOR/ADULT

## (undated) DEVICE — DRESSING ABDOMINAL PAD STERILE 8 X 10" (360EA/CA)"

## (undated) DEVICE — GLOVE BIOGEL PI INDICATOR SZ 7.0 SURGICAL PF LF - (50/BX 4BX/CA)

## (undated) DEVICE — SET LEADWIRE 5 LEAD BEDSIDE DISPOSABLE ECG (1SET OF 5/EA)

## (undated) DEVICE — ELECTRODE 850 FOAM ADHESIVE - HYDROGEL RADIOTRNSPRNT (50/PK)

## (undated) DEVICE — WATER IRRIGATION STERILE 1000ML (12EA/CA)

## (undated) DEVICE — SUTURE 2-0 VICRYL PLUS SH - 8 X 18 INCH (12/BX)

## (undated) DEVICE — GLOVE SZ 8 BIOGEL PI MICRO - PF LF (50PR/BX)

## (undated) DEVICE — BANDAGE ELASTIC STERILE MATRIX 6 X 10 (20EA/CA)

## (undated) DEVICE — SUTURE GENERAL

## (undated) DEVICE — GLOVE BIOGEL PI INDICATOR SZ 8.0 SURGICAL PF LF -(50/BX 4BX/CA)

## (undated) DEVICE — SPONGE DRAIN 4 X 4IN 6-PLY - (2/PK25PK/BX12BX/CS)